# Patient Record
Sex: FEMALE | Race: WHITE | NOT HISPANIC OR LATINO | Employment: OTHER | URBAN - METROPOLITAN AREA
[De-identification: names, ages, dates, MRNs, and addresses within clinical notes are randomized per-mention and may not be internally consistent; named-entity substitution may affect disease eponyms.]

---

## 2024-06-12 ENCOUNTER — HOSPITAL ENCOUNTER (OUTPATIENT)
Dept: RADIOLOGY | Facility: HOSPITAL | Age: 69
Discharge: HOME | End: 2024-06-12
Payer: MEDICARE

## 2024-06-12 VITALS — WEIGHT: 142 LBS | BODY MASS INDEX: 22.82 KG/M2 | HEIGHT: 66 IN

## 2024-06-12 DIAGNOSIS — Z12.31 ENCOUNTER FOR SCREENING MAMMOGRAM FOR MALIGNANT NEOPLASM OF BREAST: ICD-10-CM

## 2024-06-12 DIAGNOSIS — Z78.0 ASYMPTOMATIC MENOPAUSAL STATE: ICD-10-CM

## 2024-06-12 PROCEDURE — 77063 BREAST TOMOSYNTHESIS BI: CPT | Performed by: RADIOLOGY

## 2024-06-12 PROCEDURE — 77080 DXA BONE DENSITY AXIAL: CPT

## 2024-06-12 PROCEDURE — 77067 SCR MAMMO BI INCL CAD: CPT | Performed by: RADIOLOGY

## 2024-06-12 PROCEDURE — 77063 BREAST TOMOSYNTHESIS BI: CPT

## 2024-07-11 PROCEDURE — 88305 TISSUE EXAM BY PATHOLOGIST: CPT | Performed by: PATHOLOGY

## 2024-07-11 PROCEDURE — 88305 TISSUE EXAM BY PATHOLOGIST: CPT

## 2024-07-11 PROCEDURE — 0753T DGTZ GLS MCRSCP SLD LEVEL IV: CPT

## 2024-07-17 ENCOUNTER — LAB REQUISITION (OUTPATIENT)
Dept: LAB | Facility: HOSPITAL | Age: 69
End: 2024-07-17
Payer: MEDICARE

## 2024-07-17 DIAGNOSIS — Z85.828 PERSONAL HISTORY OF OTHER MALIGNANT NEOPLASM OF SKIN: ICD-10-CM

## 2024-07-17 DIAGNOSIS — L89.90 PRESSURE ULCER OF UNSPECIFIED SITE, UNSPECIFIED STAGE: ICD-10-CM

## 2024-07-24 LAB
LABORATORY COMMENT REPORT: NORMAL
PATH REPORT.FINAL DX SPEC: NORMAL
PATH REPORT.GROSS SPEC: NORMAL
PATH REPORT.RELEVANT HX SPEC: NORMAL
PATH REPORT.TOTAL CANCER: NORMAL

## 2024-08-09 ENCOUNTER — PRE-ADMISSION TESTING (OUTPATIENT)
Dept: PREADMISSION TESTING | Facility: HOSPITAL | Age: 69
End: 2024-08-09
Payer: MEDICARE

## 2024-08-09 VITALS
RESPIRATION RATE: 18 BRPM | SYSTOLIC BLOOD PRESSURE: 150 MMHG | DIASTOLIC BLOOD PRESSURE: 94 MMHG | BODY MASS INDEX: 22.85 KG/M2 | HEIGHT: 66 IN | WEIGHT: 142.2 LBS | OXYGEN SATURATION: 99 % | TEMPERATURE: 97.5 F | HEART RATE: 72 BPM

## 2024-08-09 DIAGNOSIS — Z01.818 PREOPERATIVE TESTING: Primary | ICD-10-CM

## 2024-08-09 LAB
ANION GAP SERPL CALC-SCNC: 9 MMOL/L
BUN SERPL-MCNC: 14 MG/DL (ref 8–25)
CALCIUM SERPL-MCNC: 9.6 MG/DL (ref 8.5–10.4)
CHLORIDE SERPL-SCNC: 99 MMOL/L (ref 97–107)
CO2 SERPL-SCNC: 29 MMOL/L (ref 24–31)
CREAT SERPL-MCNC: 0.8 MG/DL (ref 0.4–1.6)
EGFRCR SERPLBLD CKD-EPI 2021: 80 ML/MIN/1.73M*2
ERYTHROCYTE [DISTWIDTH] IN BLOOD BY AUTOMATED COUNT: 12.6 % (ref 11.5–14.5)
GLUCOSE SERPL-MCNC: 91 MG/DL (ref 65–99)
HCT VFR BLD AUTO: 43.4 % (ref 36–46)
HGB BLD-MCNC: 14.2 G/DL (ref 12–16)
MCH RBC QN AUTO: 30 PG (ref 26–34)
MCHC RBC AUTO-ENTMCNC: 32.7 G/DL (ref 32–36)
MCV RBC AUTO: 92 FL (ref 80–100)
NRBC BLD-RTO: 0 /100 WBCS (ref 0–0)
PLATELET # BLD AUTO: 376 X10*3/UL (ref 150–450)
POTASSIUM SERPL-SCNC: 3.8 MMOL/L (ref 3.4–5.1)
RBC # BLD AUTO: 4.73 X10*6/UL (ref 4–5.2)
SODIUM SERPL-SCNC: 137 MMOL/L (ref 133–145)
WBC # BLD AUTO: 9.1 X10*3/UL (ref 4.4–11.3)

## 2024-08-09 PROCEDURE — 80048 BASIC METABOLIC PNL TOTAL CA: CPT | Performed by: NURSE PRACTITIONER

## 2024-08-09 PROCEDURE — 85027 COMPLETE CBC AUTOMATED: CPT | Performed by: NURSE PRACTITIONER

## 2024-08-09 PROCEDURE — 36415 COLL VENOUS BLD VENIPUNCTURE: CPT

## 2024-08-09 PROCEDURE — 99204 OFFICE O/P NEW MOD 45 MIN: CPT | Performed by: NURSE PRACTITIONER

## 2024-08-09 RX ORDER — CHOLECALCIFEROL (VITAMIN D3) 25 MCG
1000 TABLET ORAL AS NEEDED
COMMUNITY

## 2024-08-09 RX ORDER — HYDROCHLOROTHIAZIDE 12.5 MG/1
12.5 CAPSULE ORAL EVERY MORNING
COMMUNITY

## 2024-08-09 ASSESSMENT — DUKE ACTIVITY SCORE INDEX (DASI)
CAN YOU DO YARD WORK LIKE RAKING LEAVES, WEEDING OR PUSHING A MOWER: YES
CAN YOU RUN A SHORT DISTANCE: YES
CAN YOU DO LIGHT WORK AROUND THE HOUSE LIKE DUSTING OR WASHING DISHES: YES
CAN YOU PARTICIPATE IN MODERATE RECREATIONAL ACTIVITIES LIKE GOLF, BOWLING, DANCING, DOUBLES TENNIS OR THROWING A BASEBALL OR FOOTBALL: YES
CAN YOU DO MODERATE WORK AROUND THE HOUSE LIKE VACUUMING, SWEEPING FLOORS OR CARRYING GROCERIES: YES
CAN YOU TAKE CARE OF YOURSELF (EAT, DRESS, BATHE, OR USE TOILET): YES
DASI METS SCORE: 9.9
CAN YOU HAVE SEXUAL RELATIONS: YES
CAN YOU PARTICIPATE IN STRENOUS SPORTS LIKE SWIMMING, SINGLES TENNIS, FOOTBALL, BASKETBALL, OR SKIING: YES
CAN YOU CLIMB A FLIGHT OF STAIRS OR WALK UP A HILL: YES
CAN YOU DO HEAVY WORK AROUND THE HOUSE LIKE SCRUBBING FLOORS OR LIFTING AND MOVING HEAVY FURNITURE: YES
TOTAL_SCORE: 58.2
CAN YOU WALK INDOORS, SUCH AS AROUND YOUR HOUSE: YES
CAN YOU WALK A BLOCK OR TWO ON LEVEL GROUND: YES

## 2024-08-09 ASSESSMENT — ENCOUNTER SYMPTOMS
RESPIRATORY NEGATIVE: 1
GASTROINTESTINAL NEGATIVE: 1
CONSTITUTIONAL NEGATIVE: 1
MUSCULOSKELETAL NEGATIVE: 1
NEUROLOGICAL NEGATIVE: 1
EYES NEGATIVE: 1
CARDIOVASCULAR NEGATIVE: 1
ROS SKIN COMMENTS: H/O SKIN CANCER
PSYCHIATRIC NEGATIVE: 1

## 2024-08-09 NOTE — CPM/PAT H&P
"CPM/PAT Evaluation       Name: Jerilyn CURRY Chavira (Jerilyn CURRY Chavira)  /Age: 1955/68 y.o.     In-Person       Chief Complaint: Basal Cell carcinoma left nose     HPI  A 68-year-old female with basal cell carcinoma left nose.  History of skin cancer status post multiple excisions.  She underwent skin cancer lesion removal on her nose in her 20s and in 2016.  Patient reports biopsy of the nose was done a month ago because \"My skin looked shiny on my nose\". Biopsy of the nose revealed basal cell carcinoma. Denies fever, chills, pain, or bleeding.  She is scheduled for basal cell carcinoma excision bilobed flap with frozen section from left nose, left wound closure, left face reconstruction.    Past Medical History:   Diagnosis Date    HTN (hypertension)     Skin cancer     Smoker        Past Surgical History:   Procedure Laterality Date    BREAST CYST EXCISION Left     15 years ago    BUNIONECTOMY       SECTION, LOW TRANSVERSE  2001    CYST REMOVAL Right     hand    SKIN CANCER EXCISION      forehead, leg nose         Allergies   Allergen Reactions    Bleach (Sodium Hypochlorite) Shortness of breath     DIFFICULTY BREATHING  EYES BURN SEVERELY    Egg Yolk Anaphylaxis    Bacitracin Dermatitis     \"EATS THE SKIN AWAY\"    Neosporin (Mrn-Ofm-Qcebl) [Neomycin-Bacitracnzn-Polymyxnb] Dermatitis     \"EATS THE SKIN AWAY\"       Current Outpatient Medications   Medication Sig Dispense Refill    cholecalciferol (Vitamin D-3) 25 MCG (1000 UT) tablet Take 1 tablet (1,000 Units) by mouth if needed (MOSTLY IN THE WINTER).      hydroCHLOROthiazide (Microzide) 12.5 mg capsule Take 1 capsule (12.5 mg) by mouth once daily in the morning.       No current facility-administered medications for this visit.     Review of Systems   Constitutional: Negative.    HENT: Negative.     Eyes: Negative.         Glasses   Respiratory: Negative.     Cardiovascular: Negative.    Gastrointestinal: Negative.    Genitourinary: Negative.  " "  Musculoskeletal: Negative.    Skin: Negative.         H/o skin cancer   Neurological: Negative.    Psychiatric/Behavioral: Negative.          Physical Exam  Vitals (BP repeat 143/83) reviewed.   HENT:      Head: Normocephalic and atraumatic.      Mouth/Throat:      Mouth: Mucous membranes are moist.   Eyes:      Pupils: Pupils are equal, round, and reactive to light.      Comments: glasses   Cardiovascular:      Rate and Rhythm: Normal rate and regular rhythm.   Pulmonary:      Effort: Pulmonary effort is normal.      Breath sounds: Normal breath sounds.   Abdominal:      Palpations: Abdomen is soft.   Musculoskeletal:         General: Normal range of motion.      Cervical back: Normal range of motion.   Skin:     General: Skin is warm and dry.      Comments: Left side of nose with small scar present   Neurological:      Mental Status: She is alert and oriented to person, place, and time.   Psychiatric:         Mood and Affect: Mood normal.          PAT AIRWAY:   Airway:     Mallampati::  II    Neck ROM::  Full  normal        BP (!) 150/94   Pulse 72   Temp 36.4 °C (97.5 °F) (Temporal)   Resp 18   Ht 1.676 m (5' 6\")   Wt 64.5 kg (142 lb 3.2 oz)   SpO2 99%   BMI 22.95 kg/m²         Stop Bang Score 2   CHADS:  2.8%  DASI risk score: 58.2  METS: 9.9  RCRI:1.9%  ASA: II  ARISCAT:1.6%  PAT orders CBC, BMP      Assessment and Plan:     Basal Cell carcinoma left nose Plan: Basal cell carcinoma excision bilobed flap with frozen section from left nose, left wound closure, left face reconstruction  Hypertension managed with hydrochlorothiazide  Smoker   BMI 22.95        "

## 2024-08-09 NOTE — H&P (VIEW-ONLY)
"CPM/PAT Evaluation       Name: Jerilyn CURRY Chavira (Jerilyn CURRY Chavira)  /Age: 1955/68 y.o.     In-Person       Chief Complaint: Basal Cell carcinoma left nose     HPI  A 68-year-old female with basal cell carcinoma left nose.  History of skin cancer status post multiple excisions.  She underwent skin cancer lesion removal on her nose in her 20s and in 2016.  Patient reports biopsy of the nose was done a month ago because \"My skin looked shiny on my nose\". Biopsy of the nose revealed basal cell carcinoma. Denies fever, chills, pain, or bleeding.  She is scheduled for basal cell carcinoma excision bilobed flap with frozen section from left nose, left wound closure, left face reconstruction.    Past Medical History:   Diagnosis Date    HTN (hypertension)     Skin cancer     Smoker        Past Surgical History:   Procedure Laterality Date    BREAST CYST EXCISION Left     15 years ago    BUNIONECTOMY       SECTION, LOW TRANSVERSE  2001    CYST REMOVAL Right     hand    SKIN CANCER EXCISION      forehead, leg nose         Allergies   Allergen Reactions    Bleach (Sodium Hypochlorite) Shortness of breath     DIFFICULTY BREATHING  EYES BURN SEVERELY    Egg Yolk Anaphylaxis    Bacitracin Dermatitis     \"EATS THE SKIN AWAY\"    Neosporin (Pbb-Dju-Aipaa) [Neomycin-Bacitracnzn-Polymyxnb] Dermatitis     \"EATS THE SKIN AWAY\"       Current Outpatient Medications   Medication Sig Dispense Refill    cholecalciferol (Vitamin D-3) 25 MCG (1000 UT) tablet Take 1 tablet (1,000 Units) by mouth if needed (MOSTLY IN THE WINTER).      hydroCHLOROthiazide (Microzide) 12.5 mg capsule Take 1 capsule (12.5 mg) by mouth once daily in the morning.       No current facility-administered medications for this visit.     Review of Systems   Constitutional: Negative.    HENT: Negative.     Eyes: Negative.         Glasses   Respiratory: Negative.     Cardiovascular: Negative.    Gastrointestinal: Negative.    Genitourinary: Negative.  " "  Musculoskeletal: Negative.    Skin: Negative.         H/o skin cancer   Neurological: Negative.    Psychiatric/Behavioral: Negative.          Physical Exam  Vitals (BP repeat 143/83) reviewed.   HENT:      Head: Normocephalic and atraumatic.      Mouth/Throat:      Mouth: Mucous membranes are moist.   Eyes:      Pupils: Pupils are equal, round, and reactive to light.      Comments: glasses   Cardiovascular:      Rate and Rhythm: Normal rate and regular rhythm.   Pulmonary:      Effort: Pulmonary effort is normal.      Breath sounds: Normal breath sounds.   Abdominal:      Palpations: Abdomen is soft.   Musculoskeletal:         General: Normal range of motion.      Cervical back: Normal range of motion.   Skin:     General: Skin is warm and dry.      Comments: Left side of nose with small scar present   Neurological:      Mental Status: She is alert and oriented to person, place, and time.   Psychiatric:         Mood and Affect: Mood normal.          PAT AIRWAY:   Airway:     Mallampati::  II    Neck ROM::  Full  normal        BP (!) 150/94   Pulse 72   Temp 36.4 °C (97.5 °F) (Temporal)   Resp 18   Ht 1.676 m (5' 6\")   Wt 64.5 kg (142 lb 3.2 oz)   SpO2 99%   BMI 22.95 kg/m²         Stop Bang Score 2   CHADS:  2.8%  DASI risk score: 58.2  METS: 9.9  RCRI:1.9%  ASA: II  ARISCAT:1.6%  PAT orders CBC, BMP      Assessment and Plan:     Basal Cell carcinoma left nose Plan: Basal cell carcinoma excision bilobed flap with frozen section from left nose, left wound closure, left face reconstruction  Hypertension managed with hydrochlorothiazide  Smoker   BMI 22.95        "

## 2024-08-09 NOTE — PREPROCEDURE INSTRUCTIONS
Medication List            Accurate as of August 9, 2024  8:32 AM. Always use your most recent med list.                cholecalciferol 25 MCG (1000 UT) tablet  Commonly known as: Vitamin D-3  Medication Adjustments for Surgery: Stop 7 days before surgery     hydroCHLOROthiazide 12.5 mg capsule  Commonly known as: Microzide  Medication Adjustments for Surgery: Other (Comment)  Notes to patient: TAKE MORNING OF 8/13  HOLD EVENING DOSE 8/13  HOLD MORNING DOSE 8/14                              Preoperative Fasting Guidelines    Why must I stop eating and drinking near surgery time?  With sedation, food or liquid in your stomach can enter your lungs causing serious complications  Increases nausea and vomiting    When do I need to stop eating and drinking before my surgery?  Do not eat any food or drink any liquids after midnight the night before your surgery/procedure.  You may have sips of water to take medications.        PAT DISCHARGE INSTRUCTIONS    Please call the Same Day Surgery (SDS) Department of the hospital where your procedure will be performed after 2:00 PM the day before your surgery. If you are scheduled on a Monday, or a Tuesday following a Monday holiday, you will need to call on the last business day prior to your surgery.    [facility]  [address] [place] [address], [id number] [phone]  [facility] Floor    [facility]  [address] [place] [address] [phone]  [facility]  [address] [place] [address].  [place] [id number] [phone]    Please let your surgeon know if:      You develop any open sores, shingles, burning or painful urination as these may increase your risk of an infection.   You no longer wish to have the surgery.   Any other personal circumstances change that may lead to the need to cancel or defer this surgery-such as being sick or getting admitted to any  hospital within one week of your planned procedure.    Your contact details change, such as a change of address or phone number.    Starting now:     Please DO NOT drink alcohol or smoke for 24 hours before surgery. It is well known that quitting smoking can make a huge difference to your health and recovery from surgery. The longer you abstain from smoking, the better your chances of a healthy recovery. If you need help with quitting, call 8-800-QUIT-NOW to be connected to a trained counselor who will discuss the best methods to help you quit.     Before your surgery:    Please stop all supplements 7 days prior to surgery. Or as directed by your surgeon.   Please stop taking NSAID pain medicine such as Advil and Motrin 7 days before surgery.    If you develop any fever, cough, cold, rashes, cuts, scratches, scrapes, urinary symptoms or infection anywhere on your body (including teeth and gums) prior to surgery, please call your surgeon’s office as soon as possible. This may require treatment to reduce the chance of cancellation on the day of surgery.    The day before your surgery:   DIET- Please follow the diet instructions at the top of your packet.   Get a good night’s rest.  Use the special soap for bathing if you have been instructed to use one.    Scheduled surgery times may change and you will be notified if this occurs - please check your personal voicemail for any updates.     On the morning of surgery:   Wear comfortable, loose fitting clothes which open in the front. Please do not wear moisturizers, creams, lotions, makeup or perfume.    Please bring with you to surgery:   Photo ID and insurance card   Current list of medicines and allergies   Pacemaker/ Defibrillator/Heart stent cards   CPAP machine and mask    Slings/ splints/ crutches   A copy of your complete advanced directive/DHPOA.    Please do NOT bring with you to surgery:   All jewelry and valuables should be left at home.   Prosthetic devices  such as contact lenses, hearing aids, dentures, eyelash extensions, hairpins and body piercings must be removed prior to going in to the surgical suite.    After outpatient surgery:   A responsible adult MUST accompany you at the time of discharge and stay with you for 24 hours after your surgery. You may NOT drive yourself home after surgery.    Do not drive, operate machinery, make critical decisions or do activities that require co-ordination or balance until after a night’s sleep.   Do not drink alcoholic beverages for 24 hours.   Instructions for resuming your medications will be provided by your surgeon.    CALL YOUR DOCTOR AFTER SURGERY IF YOU HAVE:     Chills and/or a fever of 101° F or higher.    Redness, swelling, pus or drainage from your surgical wound or a bad smell from the wound.    Lightheadedness, fainting or confusion.    Persistent vomiting (throwing up) and are not able to eat or drink for 12 hours.    Three or more loose, watery bowel movements in 24 hours (diarrhea).   Difficulty or pain while urinating( after non-urological surgery)    Pain and swelling in your legs, especially if it is only on one side.    Difficulty breathing or are breathing faster than normal.    Any new concerning symptoms.

## 2024-08-14 ENCOUNTER — ANESTHESIA EVENT (OUTPATIENT)
Dept: OPERATING ROOM | Facility: HOSPITAL | Age: 69
End: 2024-08-14
Payer: MEDICARE

## 2024-08-14 ENCOUNTER — ANESTHESIA (OUTPATIENT)
Dept: OPERATING ROOM | Facility: HOSPITAL | Age: 69
End: 2024-08-14
Payer: MEDICARE

## 2024-08-14 ENCOUNTER — HOSPITAL ENCOUNTER (OUTPATIENT)
Facility: HOSPITAL | Age: 69
Setting detail: OUTPATIENT SURGERY
Discharge: HOME | End: 2024-08-14
Attending: SPECIALIST | Admitting: SPECIALIST
Payer: MEDICARE

## 2024-08-14 ENCOUNTER — PHARMACY VISIT (OUTPATIENT)
Dept: PHARMACY | Facility: CLINIC | Age: 69
End: 2024-08-14
Payer: MEDICARE

## 2024-08-14 VITALS
RESPIRATION RATE: 20 BRPM | HEIGHT: 66 IN | OXYGEN SATURATION: 94 % | WEIGHT: 142 LBS | DIASTOLIC BLOOD PRESSURE: 71 MMHG | HEART RATE: 75 BPM | BODY MASS INDEX: 22.82 KG/M2 | TEMPERATURE: 98.4 F | SYSTOLIC BLOOD PRESSURE: 124 MMHG

## 2024-08-14 DIAGNOSIS — C44.311 BASAL CELL CARCINOMA OF NOSE: ICD-10-CM

## 2024-08-14 PROCEDURE — 3700000001 HC GENERAL ANESTHESIA TIME - INITIAL BASE CHARGE: Performed by: SPECIALIST

## 2024-08-14 PROCEDURE — 88332 PATH CONSLTJ SURG EA ADD BLK: CPT | Mod: TC | Performed by: SPECIALIST

## 2024-08-14 PROCEDURE — 7100000001 HC RECOVERY ROOM TIME - INITIAL BASE CHARGE: Performed by: SPECIALIST

## 2024-08-14 PROCEDURE — 7100000010 HC PHASE TWO TIME - EACH INCREMENTAL 1 MINUTE: Performed by: SPECIALIST

## 2024-08-14 PROCEDURE — 2500000004 HC RX 250 GENERAL PHARMACY W/ HCPCS (ALT 636 FOR OP/ED): Performed by: ANESTHESIOLOGIST ASSISTANT

## 2024-08-14 PROCEDURE — 88305 TISSUE EXAM BY PATHOLOGIST: CPT | Mod: TC | Performed by: SPECIALIST

## 2024-08-14 PROCEDURE — 2500000002 HC RX 250 W HCPCS SELF ADMINISTERED DRUGS (ALT 637 FOR MEDICARE OP, ALT 636 FOR OP/ED): Performed by: ANESTHESIOLOGY

## 2024-08-14 PROCEDURE — 7100000002 HC RECOVERY ROOM TIME - EACH INCREMENTAL 1 MINUTE: Performed by: SPECIALIST

## 2024-08-14 PROCEDURE — A4649 SURGICAL SUPPLIES: HCPCS | Performed by: SPECIALIST

## 2024-08-14 PROCEDURE — 3600000004 HC OR TIME - INITIAL BASE CHARGE - PROCEDURE LEVEL FOUR: Performed by: SPECIALIST

## 2024-08-14 PROCEDURE — 2500000005 HC RX 250 GENERAL PHARMACY W/O HCPCS: Performed by: ANESTHESIOLOGIST ASSISTANT

## 2024-08-14 PROCEDURE — 2720000007 HC OR 272 NO HCPCS: Performed by: SPECIALIST

## 2024-08-14 PROCEDURE — 7100000009 HC PHASE TWO TIME - INITIAL BASE CHARGE: Performed by: SPECIALIST

## 2024-08-14 PROCEDURE — RXMED WILLOW AMBULATORY MEDICATION CHARGE

## 2024-08-14 PROCEDURE — 3600000009 HC OR TIME - EACH INCREMENTAL 1 MINUTE - PROCEDURE LEVEL FOUR: Performed by: SPECIALIST

## 2024-08-14 PROCEDURE — 2500000005 HC RX 250 GENERAL PHARMACY W/O HCPCS: Performed by: SPECIALIST

## 2024-08-14 PROCEDURE — 2500000004 HC RX 250 GENERAL PHARMACY W/ HCPCS (ALT 636 FOR OP/ED): Mod: JZ | Performed by: SPECIALIST

## 2024-08-14 PROCEDURE — 2500000004 HC RX 250 GENERAL PHARMACY W/ HCPCS (ALT 636 FOR OP/ED): Performed by: SPECIALIST

## 2024-08-14 PROCEDURE — 3700000002 HC GENERAL ANESTHESIA TIME - EACH INCREMENTAL 1 MINUTE: Performed by: SPECIALIST

## 2024-08-14 PROCEDURE — 2500000001 HC RX 250 WO HCPCS SELF ADMINISTERED DRUGS (ALT 637 FOR MEDICARE OP): Performed by: SPECIALIST

## 2024-08-14 RX ORDER — OXYCODONE HCL 10 MG/1
10 TABLET, FILM COATED, EXTENDED RELEASE ORAL ONCE AS NEEDED
Status: CANCELLED | OUTPATIENT
Start: 2024-08-14

## 2024-08-14 RX ORDER — FENTANYL CITRATE 50 UG/ML
INJECTION, SOLUTION INTRAMUSCULAR; INTRAVENOUS AS NEEDED
Status: DISCONTINUED | OUTPATIENT
Start: 2024-08-14 | End: 2024-08-14

## 2024-08-14 RX ORDER — ONDANSETRON 4 MG/1
4 TABLET, FILM COATED ORAL EVERY 6 HOURS PRN
Qty: 10 TABLET | Refills: 0 | Status: SHIPPED | OUTPATIENT
Start: 2024-08-14

## 2024-08-14 RX ORDER — ALBUTEROL SULFATE 0.83 MG/ML
2.5 SOLUTION RESPIRATORY (INHALATION) ONCE
Status: COMPLETED | OUTPATIENT
Start: 2024-08-14 | End: 2024-08-14

## 2024-08-14 RX ORDER — ACETAMINOPHEN 500 MG
500 TABLET ORAL ONCE
Status: COMPLETED | OUTPATIENT
Start: 2024-08-14 | End: 2024-08-14

## 2024-08-14 RX ORDER — MUPIROCIN 20 MG/G
OINTMENT TOPICAL 3 TIMES DAILY
Qty: 22 G | Refills: 1 | Status: SHIPPED | OUTPATIENT
Start: 2024-08-14

## 2024-08-14 RX ORDER — KETOROLAC TROMETHAMINE 30 MG/ML
15 INJECTION, SOLUTION INTRAMUSCULAR; INTRAVENOUS ONCE AS NEEDED
Status: DISCONTINUED | OUTPATIENT
Start: 2024-08-14 | End: 2024-08-14 | Stop reason: HOSPADM

## 2024-08-14 RX ORDER — ONDANSETRON HYDROCHLORIDE 2 MG/ML
INJECTION, SOLUTION INTRAVENOUS AS NEEDED
Status: DISCONTINUED | OUTPATIENT
Start: 2024-08-14 | End: 2024-08-14

## 2024-08-14 RX ORDER — NORETHINDRONE AND ETHINYL ESTRADIOL 0.5-0.035
KIT ORAL AS NEEDED
Status: DISCONTINUED | OUTPATIENT
Start: 2024-08-14 | End: 2024-08-14

## 2024-08-14 RX ORDER — CEFAZOLIN SODIUM 2 G/100ML
2 INJECTION, SOLUTION INTRAVENOUS ONCE
Status: COMPLETED | OUTPATIENT
Start: 2024-08-14 | End: 2024-08-14

## 2024-08-14 RX ORDER — LIDOCAINE HYDROCHLORIDE AND EPINEPHRINE 10; 10 MG/ML; UG/ML
INJECTION, SOLUTION INFILTRATION; PERINEURAL AS NEEDED
Status: DISCONTINUED | OUTPATIENT
Start: 2024-08-14 | End: 2024-08-14 | Stop reason: HOSPADM

## 2024-08-14 RX ORDER — LABETALOL HYDROCHLORIDE 5 MG/ML
10 INJECTION, SOLUTION INTRAVENOUS ONCE AS NEEDED
Status: DISCONTINUED | OUTPATIENT
Start: 2024-08-14 | End: 2024-08-14 | Stop reason: HOSPADM

## 2024-08-14 RX ORDER — ACETAMINOPHEN 500 MG
500 TABLET ORAL EVERY 6 HOURS PRN
COMMUNITY

## 2024-08-14 RX ORDER — METOCLOPRAMIDE 10 MG/1
10 TABLET ORAL ONCE
Status: DISCONTINUED | OUTPATIENT
Start: 2024-08-14 | End: 2024-08-14 | Stop reason: HOSPADM

## 2024-08-14 RX ORDER — FAMOTIDINE 20 MG/1
20 TABLET, FILM COATED ORAL ONCE
Status: DISCONTINUED | OUTPATIENT
Start: 2024-08-14 | End: 2024-08-14 | Stop reason: HOSPADM

## 2024-08-14 RX ORDER — OXYCODONE AND ACETAMINOPHEN 5; 325 MG/1; MG/1
1 TABLET ORAL EVERY 6 HOURS PRN
Qty: 10 TABLET | Refills: 0 | Status: SHIPPED | OUTPATIENT
Start: 2024-08-14

## 2024-08-14 RX ORDER — GLYCOPYRROLATE 0.2 MG/ML
INJECTION INTRAMUSCULAR; INTRAVENOUS AS NEEDED
Status: DISCONTINUED | OUTPATIENT
Start: 2024-08-14 | End: 2024-08-14

## 2024-08-14 RX ORDER — BUPIVACAINE HYDROCHLORIDE 5 MG/ML
INJECTION, SOLUTION PERINEURAL AS NEEDED
Status: DISCONTINUED | OUTPATIENT
Start: 2024-08-14 | End: 2024-08-14 | Stop reason: HOSPADM

## 2024-08-14 RX ORDER — MIDAZOLAM HYDROCHLORIDE 1 MG/ML
INJECTION, SOLUTION INTRAMUSCULAR; INTRAVENOUS AS NEEDED
Status: DISCONTINUED | OUTPATIENT
Start: 2024-08-14 | End: 2024-08-14

## 2024-08-14 RX ORDER — CEPHALEXIN 500 MG/1
500 CAPSULE ORAL 4 TIMES DAILY
Qty: 40 CAPSULE | Refills: 0 | Status: SHIPPED | OUTPATIENT
Start: 2024-08-14 | End: 2024-08-24

## 2024-08-14 RX ORDER — NORETHINDRONE AND ETHINYL ESTRADIOL 0.5-0.035
50 KIT ORAL ONCE
Status: DISCONTINUED | OUTPATIENT
Start: 2024-08-14 | End: 2024-08-14 | Stop reason: HOSPADM

## 2024-08-14 RX ORDER — PROPOFOL 10 MG/ML
INJECTION, EMULSION INTRAVENOUS AS NEEDED
Status: DISCONTINUED | OUTPATIENT
Start: 2024-08-14 | End: 2024-08-14

## 2024-08-14 RX ORDER — ONDANSETRON HYDROCHLORIDE 2 MG/ML
4 INJECTION, SOLUTION INTRAVENOUS ONCE AS NEEDED
Status: DISCONTINUED | OUTPATIENT
Start: 2024-08-14 | End: 2024-08-14 | Stop reason: HOSPADM

## 2024-08-14 RX ORDER — HYDRALAZINE HYDROCHLORIDE 20 MG/ML
10 INJECTION INTRAMUSCULAR; INTRAVENOUS EVERY 30 MIN PRN
Status: DISCONTINUED | OUTPATIENT
Start: 2024-08-14 | End: 2024-08-14 | Stop reason: HOSPADM

## 2024-08-14 RX ORDER — GABAPENTIN 300 MG/1
600 CAPSULE ORAL ONCE
Status: COMPLETED | OUTPATIENT
Start: 2024-08-14 | End: 2024-08-14

## 2024-08-14 RX ORDER — ONDANSETRON 4 MG/1
4 TABLET, ORALLY DISINTEGRATING ORAL ONCE
Status: COMPLETED | OUTPATIENT
Start: 2024-08-14 | End: 2024-08-14

## 2024-08-14 RX ORDER — DEXTROSE, SODIUM CHLORIDE, SODIUM LACTATE, POTASSIUM CHLORIDE, AND CALCIUM CHLORIDE 5; .6; .31; .03; .02 G/100ML; G/100ML; G/100ML; G/100ML; G/100ML
100 INJECTION, SOLUTION INTRAVENOUS CONTINUOUS
Status: DISCONTINUED | OUTPATIENT
Start: 2024-08-14 | End: 2024-08-14 | Stop reason: HOSPADM

## 2024-08-14 RX ORDER — ALBUTEROL SULFATE 0.83 MG/ML
2.5 SOLUTION RESPIRATORY (INHALATION) ONCE AS NEEDED
Status: DISCONTINUED | OUTPATIENT
Start: 2024-08-14 | End: 2024-08-14 | Stop reason: HOSPADM

## 2024-08-14 RX ORDER — ACETAMINOPHEN 325 MG/1
650 TABLET ORAL ONCE
Status: DISCONTINUED | OUTPATIENT
Start: 2024-08-14 | End: 2024-08-14

## 2024-08-14 RX ORDER — TRANEXAMIC ACID 100 MG/ML
INJECTION, SOLUTION INTRAVENOUS AS NEEDED
Status: DISCONTINUED | OUTPATIENT
Start: 2024-08-14 | End: 2024-08-14

## 2024-08-14 RX ORDER — DIPHENHYDRAMINE HYDROCHLORIDE 50 MG/ML
12.5 INJECTION INTRAMUSCULAR; INTRAVENOUS ONCE AS NEEDED
Status: DISCONTINUED | OUTPATIENT
Start: 2024-08-14 | End: 2024-08-14 | Stop reason: HOSPADM

## 2024-08-14 RX ORDER — CELECOXIB 200 MG/1
400 CAPSULE ORAL ONCE
Status: COMPLETED | OUTPATIENT
Start: 2024-08-14 | End: 2024-08-14

## 2024-08-14 SDOH — HEALTH STABILITY: MENTAL HEALTH: CURRENT SMOKER: 0

## 2024-08-14 ASSESSMENT — PAIN SCALES - GENERAL
PAINLEVEL_OUTOF10: 0 - NO PAIN
PAIN_LEVEL: 2
PAINLEVEL_OUTOF10: 0 - NO PAIN

## 2024-08-14 ASSESSMENT — COLUMBIA-SUICIDE SEVERITY RATING SCALE - C-SSRS
2. HAVE YOU ACTUALLY HAD ANY THOUGHTS OF KILLING YOURSELF?: NO
1. IN THE PAST MONTH, HAVE YOU WISHED YOU WERE DEAD OR WISHED YOU COULD GO TO SLEEP AND NOT WAKE UP?: NO
6. HAVE YOU EVER DONE ANYTHING, STARTED TO DO ANYTHING, OR PREPARED TO DO ANYTHING TO END YOUR LIFE?: NO

## 2024-08-14 ASSESSMENT — PAIN - FUNCTIONAL ASSESSMENT
PAIN_FUNCTIONAL_ASSESSMENT: 0-10
PAIN_FUNCTIONAL_ASSESSMENT: FLACC (FACE, LEGS, ACTIVITY, CRY, CONSOLABILITY)
PAIN_FUNCTIONAL_ASSESSMENT: 0-10
PAIN_FUNCTIONAL_ASSESSMENT: 0-10

## 2024-08-14 NOTE — PERIOPERATIVE NURSING NOTE
1029- Message left with Dr Cheek's office, bactroban cream not available until 8/15, does he want the ointment instead? [facility] to call back with POC.

## 2024-08-14 NOTE — ANESTHESIA POSTPROCEDURE EVALUATION
"Patient: Jerilyn CURRY Chavira    Procedure Summary       Date: 08/14/24 Room / Location: [facility] 05 / [facility]    Anesthesia Start: 1038 Anesthesia Stop: 1321    Procedures:       Excision of Basal Cell Carcinoma Nose with Frozen Section, Bilobed Flap (Left: Nose)      Wound Closure (Left: Nose)      Reconstruction Face (Left: Nose) Diagnosis:       Basal cell carcinoma of nose      (Basal Cell carcinoma left nose)    Surgeons: Matti Cheek MD Responsible Provider: Ab Gonzales DO    Anesthesia Type: general ASA Status: 3            Anesthesia Type: general  /83   Pulse 59   Temp 36.3 °C (97.3 °F) (Temporal)   Resp 16   Ht 1.676 m (5' 6\")   Wt 64.4 kg (142 lb)   SpO2 100%   BMI 22.92 kg/m²     Anesthesia Post Evaluation    Patient location during evaluation: bedside  Patient participation: complete - patient participated  Level of consciousness: awake  Pain score: 2  Pain management: adequate  Airway patency: patent  Two or more strategies used to mitigate risk of obstructive sleep apnea  Cardiovascular status: acceptable  Respiratory status: acceptable  Hydration status: acceptable  Postoperative Nausea and Vomiting: none        No notable events documented.    "

## 2024-08-14 NOTE — POST-PROCEDURE NOTE
Pt received from pacu to [facility] via cart in no acute distress.  Cookies and gingerale taken well.    1530 up to bathroom . Voiding without any problems.  Daughter in room with pt.    1530  instructed the pt not to wash face and to apply ointment as ordered.

## 2024-08-14 NOTE — ANESTHESIA PROCEDURE NOTES
Airway  Date/Time: 8/14/2024 11:07 AM  Urgency: elective    Airway not difficult    Staffing  Performed: CAA   Authorized by: Ab Gonzales DO    Performed by: NAYELI Kirby  Patient location during procedure: OR    Indications and Patient Condition  Indications for airway management: anesthesia  Spontaneous Ventilation: absent  Sedation level: deep  Preoxygenated: yes  Patient position: sniffing  Mask difficulty assessment: 0 - not attempted    Final Airway Details  Final airway type: supraglottic airway      Successful airway: Size 3      Additional Comments  Easy LMA by student

## 2024-08-14 NOTE — ANESTHESIA PREPROCEDURE EVALUATION
Patient: Jerilyn CURRY Chavira    Procedure Information       Date/Time: 08/14/24 1130    Procedures:       Excision of Basal Cell Carcinoma Nose with Frozen Section, Bilobed Flap (Left: Nose)      Wound Closure (Left: Nose)      Reconstruction Face (Left: Nose) - Coordinate for frozen section    Location: [facility] / [facility]    Surgeons: Matti Cheek MD            Relevant Problems   No relevant active problems       Clinical information reviewed:   Tobacco  Allergies  Meds   Med Hx  Surg Hx  OB Status  Fam Hx  Soc   Hx        NPO Detail:  NPO/Void Status  Carbohydrate Drink Given Prior to Surgery? : N  Date of Last Liquid: 08/14/24  Time of Last Liquid: 0800  Date of Last Solid: 08/13/24  Time of Last Solid: 1700  Last Intake Type: Clear fluids  Time of Last Void: 0700         Physical Exam    Airway  Mallampati: II  TM distance: >3 FB     Cardiovascular   Rhythm: regular  Rate: normal     Dental - normal exam     Pulmonary   Breath sounds clear to auscultation     Abdominal   Abdomen: soft             Anesthesia Plan    History of general anesthesia?: yes  History of complications of general anesthesia?: no    ASA 3     general     The patient is not a current smoker.    intravenous induction   Postoperative administration of opioids is intended.  Anesthetic plan and risks discussed with patient.    Plan discussed with CRNA, attending and CAA.

## 2024-08-14 NOTE — OP NOTE
Excision of Basal Cell Carcinoma Nose with Frozen Section, Bilobed Flap (L), Wound Closure (L), Reconstruction Face (L) Operative Note     Date: 2024  OR Location: [facility]    Name: Jerilyn Chavira, : 1955, Age: 68 y.o., MRN: [id number], Sex: female    Diagnosis  Pre-op Diagnosis      * Basal cell carcinoma of nose [C44.311] positive margin x 2 nose basal cell carcinoma Post-op Diagnosis     * Basal cell carcinoma of nose [C44.311]     Procedures  Excision of Basal Cell Carcinoma Nose with Frozen Section, Bilobed Flap    Wound Closure  50679 - DC REPAIR COMPLEX EYELID/NOSE/EAR/LIP 2.6-7.5 CM    Reconstruction Face  50808 - DC ADJT TIS TRNSFR/REARRGMT E/N/E/L DFCT 10 SQ CM/<    Excision left nose biopsy-proven basal cell carcinoma with frozen section x 2 and excision permanent section margin left nose 3.1 cm  Fasciocutaneous flap nose  Local tissue rearrangement nose 4 x 4 cm  Surgeons      * Matti Cheek - Primary    Resident/Fellow/Other Assistant:  Surgeons and Role:  * No surgeons found with a matching role *    Procedure Summary  Anesthesia: Anesthesia type not filed in the log.  ASA: III  Anesthesia Staff: Anesthesiologist: Ab Gonzales DO  CRNA: MARYAM Castañeda-CRNA  C-AA: NAYELI Kirby  Estimated Blood Loss: 10 mL  Intra-op Medications: Administrations occurring from 1130 to 1405 on 24:  * No intraprocedure medications in log *           Anesthesia Record               Intraprocedure I/O Totals          Intake    LR bolus 900.00 mL    Tranexamic Acid 0.00 mL    The total shown is the total volume documented since Anesthesia Start was filed.    Total Intake 900 mL       Output    Est. Blood Loss 10 mL    Total Output 10 mL       Net    Net Volume 890 mL          Specimen:   ID Type Source Tests Collected by Time   1 : LEFT NOSE BCC SHORT STITCH SUPERIOR, LONG LEFT LATERAL Tissue SKIN EXCISIONAL BIOPSY SURGICAL PATHOLOGY EXAM Matti Cheek MD 2024 1059   2 : RE-EXCISION  LEFT NOSE BCC, NEW MARGIN INK, LONG STITCH LATERAL, SHORT STITCH INFERIOR Tissue SKIN EXCISIONAL BIOPSY SURGICAL PATHOLOGY EXAM Matti Cheek MD 8/14/2024 1157   3 : RE-EXCISION LEFT NOSE BCC, NEW LEFT LATERAL MARGIN INK Tissue SKIN EXCISIONAL BIOPSY SURGICAL PATHOLOGY EXAM Matti Cheek MD 8/14/2024 1241        Staff:   Circulator: Adolph  Scrub Person: Chase Bautista Scrub: Obie      Findings: Positive margin left lateral and central inferior margins.  Deep margin positive left lateral.  Subsequent skin cancer frozen section head close margin left lateral requiring third excisional margin sent for permanent section discussed with Dr. Anderson    Indications: Jerilyn Chavira is an 68 y.o. female who is having surgery for Basal Cell carcinoma left nose.  This lesion has been neglected over 2 years since recognition with the patient declining surgical intervention until more recently.  After extensive smoking cessation counseling the patient is proceeding with surgical extirpation.    The patient was seen in the preoperative area. The risks, benefits, complications, treatment options, non-operative alternatives, expected recovery and outcomes were discussed with the patient. The possibilities of reaction to medication, pulmonary aspiration, injury to surrounding structures, bleeding, recurrent infection, the need for additional procedures, failure to diagnose a condition, and creating a complication requiring transfusion or operation were discussed with the patient. The patient concurred with the proposed plan, giving informed consent.  The site of surgery was properly noted/marked if necessary per policy. The patient has been actively warmed in preoperative area. Preoperative antibiotics have been ordered and given within 1 hours of incision. Venous thrombosis prophylaxis have been ordered including bilateral sequential compression devices    Procedure Details: The patient was identified in the holding area with  her daughter present.  A team huddle was performed.  Antibiotic was administered on-call to the OR.  She acknowledged she was still smoking and a discussion of case cancellation was reviewed with the patient accepted the risks moving forward since she is already delayed 2 years since recognition of this basal cell carcinoma.  At patient was taken the operating room by Dr. Gonzales and team.  A check-in was observed.  Flowtron's  Function prior to the induction of anesthesia.  General anesthesia with LMA was placed without difficulty.  She was prepped and draped with baby shampoo.  Timeout was observed.  She was given a field block of her nasal dorsum and radix area in each Anish dorsum onto the cheek with a total of 8 cc of 1% lidocaine with epinephrine mixed one-to-one with 0.5 Marcaine plain.  Vasoconstriction was awaited.  An excision of the cutaneous lesion with a clinical margin marked was undertaken.  A suture was placed short stitch superior and long stitch left lateral with Prolene for orientation purposes.  The specimen was marked at the right lateralmost aspect and the caudal and cephalic most aspects with a marking pen on the nose in situ.  The specimen is handcarried for frozen section with Dr. Lim.  The left lateral and inferior margins were positive focally microscopically.  This required a crescentic excision of a few more millimeters which was also oriented with a short stitch inferior and long stitch lateral with the inked margin being the new margin.  This to return with a close josh margin microscopically.  A third excision in this region was undertaken and sent for permanent section with the inked margin as the free margin.  Hemostasis was achieved with local and Bovie coagulation bipolar cautery 2.5 loupe magnification.  Throughout the procedure was surgically assistant was required for exposure, hemostasis, retraction, and patient's safety.  The bilobed flap was marked based based on the left  lateral aspect.  A backcut which was required to go into the radix region was also marked.  The skin flap was incised this was extensively undermined circumferentially on the nose and cephalically under the flap domains.  Retention sutures of 5-0 Prolene were placed.  Small areas of dogears were resected at the apical left Anish dorsum and in the most lateral area of the alar crease. Interrupted buried 5-0 Monocryl sutures were placed in the muscle repair of the radix.  Hemostasis was pristine.  The skin was closed with running 5-0 fast-absorbing plain gut suture without tension.  The patient was cleansed with Betasept and blood.  She was transported on to the waiting gurney and then extubated.  She was transported in satisfactory condition to postanesthesia care unit.  Blood loss was 10 cc.  Complications:  None; patient tolerated the procedure well.    Disposition: PACU - hemodynamically stable.  Condition: stable     This procedure was not performed to treat primary cutaneous melanoma through wide local excision      Additional Details:     Attending Attestation: I performed the procedure.    Matti Cheek  Phone Number: [phone]

## 2024-08-21 LAB
LABORATORY COMMENT REPORT: NORMAL
Lab: NORMAL
PATH REPORT.FINAL DX SPEC: NORMAL
PATH REPORT.GROSS SPEC: NORMAL
PATH REPORT.RELEVANT HX SPEC: NORMAL
PATH REPORT.TOTAL CANCER: NORMAL

## 2025-06-16 ENCOUNTER — HOSPITAL ENCOUNTER (OUTPATIENT)
Dept: RADIOLOGY | Facility: HOSPITAL | Age: 70
Discharge: HOME | End: 2025-06-16
Payer: MEDICARE

## 2025-06-16 DIAGNOSIS — Z12.31 ENCOUNTER FOR SCREENING MAMMOGRAM FOR MALIGNANT NEOPLASM OF BREAST: ICD-10-CM

## 2025-06-16 PROCEDURE — 77067 SCR MAMMO BI INCL CAD: CPT

## 2025-06-16 PROCEDURE — 77067 SCR MAMMO BI INCL CAD: CPT | Performed by: RADIOLOGY

## 2025-06-16 PROCEDURE — 77063 BREAST TOMOSYNTHESIS BI: CPT | Performed by: RADIOLOGY

## 2025-07-07 ENCOUNTER — LAB REQUISITION (OUTPATIENT)
Dept: LAB | Facility: HOSPITAL | Age: 70
End: 2025-07-07
Payer: MEDICARE

## 2025-07-07 ENCOUNTER — TELEPHONE (OUTPATIENT)
Dept: SURGERY | Facility: CLINIC | Age: 70
End: 2025-07-07
Payer: MEDICARE

## 2025-07-07 DIAGNOSIS — Z12.11 ENCOUNTER FOR SCREENING FOR MALIGNANT NEOPLASM OF COLON: ICD-10-CM

## 2025-07-07 PROCEDURE — 88341 IMHCHEM/IMCYTCHM EA ADD ANTB: CPT

## 2025-07-07 PROCEDURE — 88305 TISSUE EXAM BY PATHOLOGIST: CPT

## 2025-07-07 PROCEDURE — 88342 IMHCHEM/IMCYTCHM 1ST ANTB: CPT

## 2025-07-08 DIAGNOSIS — R52 PAIN, UNSPECIFIED: Primary | ICD-10-CM

## 2025-07-09 ENCOUNTER — HOSPITAL ENCOUNTER (OUTPATIENT)
Dept: RADIOLOGY | Facility: HOSPITAL | Age: 70
Discharge: HOME | End: 2025-07-09
Payer: MEDICARE

## 2025-07-09 ENCOUNTER — LAB (OUTPATIENT)
Dept: LAB | Facility: HOSPITAL | Age: 70
End: 2025-07-09
Payer: MEDICARE

## 2025-07-09 DIAGNOSIS — R52 PAIN, UNSPECIFIED: ICD-10-CM

## 2025-07-09 DIAGNOSIS — C18.2 MALIGNANT NEOPLASM OF ASCENDING COLON (MULTI): ICD-10-CM

## 2025-07-09 LAB
CREAT SERPL-MCNC: 0.73 MG/DL (ref 0.5–1.05)
EGFRCR SERPLBLD CKD-EPI 2021: 89 ML/MIN/1.73M*2

## 2025-07-09 PROCEDURE — 71260 CT THORAX DX C+: CPT

## 2025-07-09 PROCEDURE — 82565 ASSAY OF CREATININE: CPT

## 2025-07-09 PROCEDURE — 2550000001 HC RX 255 CONTRASTS

## 2025-07-09 PROCEDURE — 74177 CT ABD & PELVIS W/CONTRAST: CPT | Performed by: RADIOLOGY

## 2025-07-09 PROCEDURE — 71260 CT THORAX DX C+: CPT | Performed by: RADIOLOGY

## 2025-07-09 RX ADMIN — IOHEXOL 75 ML: 350 INJECTION, SOLUTION INTRAVENOUS at 16:27

## 2025-07-10 LAB
LAB AP ASR DISCLAIMER: NORMAL
LABORATORY COMMENT REPORT: NORMAL
PATH REPORT.ADDENDUM SPEC: NORMAL
PATH REPORT.FINAL DX SPEC: NORMAL
PATH REPORT.GROSS SPEC: NORMAL
PATH REPORT.RELEVANT HX SPEC: NORMAL
PATH REPORT.TOTAL CANCER: NORMAL
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
## 2025-07-11 PROBLEM — G93.32 CHRONIC FATIGUE SYNDROME: Status: ACTIVE | Noted: 2025-07-11

## 2025-07-11 PROBLEM — S49.92XA UNSPECIFIED INJURY OF LEFT SHOULDER AND UPPER ARM, INITIAL ENCOUNTER: Status: ACTIVE | Noted: 2023-06-04

## 2025-07-11 PROBLEM — S42.209A CLOSED FRACTURE OF PROXIMAL END OF HUMERUS: Status: ACTIVE | Noted: 2023-06-04

## 2025-07-11 PROBLEM — Z51.89 ENCOUNTER FOR OTHER SPECIFIED AFTERCARE: Status: ACTIVE | Noted: 2023-07-13

## 2025-07-11 PROBLEM — M81.0 AGE-RELATED OSTEOPOROSIS WITHOUT CURRENT PATHOLOGICAL FRACTURE: Status: ACTIVE | Noted: 2025-07-11

## 2025-07-11 PROBLEM — R87.810 PAPANICOLAOU SMEAR OF CERVIX WITH POSITIVE HIGH RISK HUMAN PAPILLOMA VIRUS (HPV) TEST: Status: ACTIVE | Noted: 2025-07-11

## 2025-07-11 PROBLEM — E78.2 MIXED HYPERLIPIDEMIA: Status: ACTIVE | Noted: 2025-07-11

## 2025-07-11 PROBLEM — E55.9 VITAMIN D DEFICIENCY: Status: ACTIVE | Noted: 2025-07-11

## 2025-07-11 PROBLEM — I10 ESSENTIAL HYPERTENSION: Status: ACTIVE | Noted: 2025-07-11

## 2025-07-11 PROBLEM — M25.512 PAIN IN LEFT SHOULDER: Status: ACTIVE | Noted: 2023-06-04
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
## 2025-07-11 PROBLEM — Z88.2 ALLERGY STATUS TO SULFONAMIDES: Status: ACTIVE | Noted: 2023-06-04

## 2025-07-11 RX ORDER — IBANDRONATE SODIUM 150 MG/1
TABLET, FILM COATED ORAL
COMMUNITY
End: 2025-07-23 | Stop reason: WASHOUT

## 2025-07-23 ENCOUNTER — OFFICE VISIT (OUTPATIENT)
Dept: SURGERY | Facility: CLINIC | Age: 70
End: 2025-07-23
Payer: MEDICARE

## 2025-07-23 VITALS
HEIGHT: 66 IN | SYSTOLIC BLOOD PRESSURE: 160 MMHG | OXYGEN SATURATION: 98 % | BODY MASS INDEX: 23.63 KG/M2 | DIASTOLIC BLOOD PRESSURE: 80 MMHG | HEART RATE: 84 BPM | TEMPERATURE: 98.2 F | WEIGHT: 147 LBS

## 2025-07-23 DIAGNOSIS — C18.9 MALIGNANT NEOPLASM OF COLON, UNSPECIFIED PART OF COLON (MULTI): Primary | ICD-10-CM

## 2025-07-23 DIAGNOSIS — C18.2 MALIGNANT NEOPLASM OF ASCENDING COLON (MULTI): ICD-10-CM

## 2025-07-23 DIAGNOSIS — E27.9 LESION OF ADRENAL GLAND (MULTI): ICD-10-CM

## 2025-07-23 PROCEDURE — 3008F BODY MASS INDEX DOCD: CPT | Performed by: STUDENT IN AN ORGANIZED HEALTH CARE EDUCATION/TRAINING PROGRAM

## 2025-07-23 PROCEDURE — 3077F SYST BP >= 140 MM HG: CPT | Performed by: STUDENT IN AN ORGANIZED HEALTH CARE EDUCATION/TRAINING PROGRAM

## 2025-07-23 PROCEDURE — 1159F MED LIST DOCD IN RCRD: CPT | Performed by: STUDENT IN AN ORGANIZED HEALTH CARE EDUCATION/TRAINING PROGRAM

## 2025-07-23 PROCEDURE — G2211 COMPLEX E/M VISIT ADD ON: HCPCS | Performed by: STUDENT IN AN ORGANIZED HEALTH CARE EDUCATION/TRAINING PROGRAM

## 2025-07-23 PROCEDURE — 99214 OFFICE O/P EST MOD 30 MIN: CPT | Performed by: STUDENT IN AN ORGANIZED HEALTH CARE EDUCATION/TRAINING PROGRAM

## 2025-07-23 PROCEDURE — 1125F AMNT PAIN NOTED PAIN PRSNT: CPT | Performed by: STUDENT IN AN ORGANIZED HEALTH CARE EDUCATION/TRAINING PROGRAM

## 2025-07-23 PROCEDURE — 99204 OFFICE O/P NEW MOD 45 MIN: CPT | Performed by: STUDENT IN AN ORGANIZED HEALTH CARE EDUCATION/TRAINING PROGRAM

## 2025-07-23 PROCEDURE — 3079F DIAST BP 80-89 MM HG: CPT | Performed by: STUDENT IN AN ORGANIZED HEALTH CARE EDUCATION/TRAINING PROGRAM

## 2025-07-23 RX ORDER — METRONIDAZOLE 500 MG/100ML
500 INJECTION, SOLUTION INTRAVENOUS ONCE
OUTPATIENT
Start: 2025-07-23 | End: 2025-07-23

## 2025-07-23 RX ORDER — ACETAMINOPHEN 500 MG
1000 TABLET ORAL ONCE
OUTPATIENT
Start: 2025-07-23 | End: 2025-07-23

## 2025-07-23 RX ORDER — HEPARIN SODIUM 5000 [USP'U]/ML
5000 INJECTION, SOLUTION INTRAVENOUS; SUBCUTANEOUS ONCE
OUTPATIENT
Start: 2025-07-23 | End: 2025-07-23

## 2025-07-23 RX ORDER — CEFAZOLIN SODIUM 2 G/100ML
2 INJECTION, SOLUTION INTRAVENOUS ONCE
OUTPATIENT
Start: 2025-07-23 | End: 2025-07-23

## 2025-07-23 SDOH — ECONOMIC STABILITY: FOOD INSECURITY: WITHIN THE PAST 12 MONTHS, YOU WORRIED THAT YOUR FOOD WOULD RUN OUT BEFORE YOU GOT MONEY TO BUY MORE.: NEVER TRUE

## 2025-07-23 SDOH — ECONOMIC STABILITY: FOOD INSECURITY: WITHIN THE PAST 12 MONTHS, THE FOOD YOU BOUGHT JUST DIDN'T LAST AND YOU DIDN'T HAVE MONEY TO GET MORE.: NEVER TRUE

## 2025-07-23 ASSESSMENT — ENCOUNTER SYMPTOMS
ADENOPATHY: 0
HEADACHES: 0
BRUISES/BLEEDS EASILY: 0
SHORTNESS OF BREATH: 0
SPEECH DIFFICULTY: 0
PALPITATIONS: 0
HEMATURIA: 0
DIARRHEA: 0
WOUND: 0
VOICE CHANGE: 0
BLOOD IN STOOL: 0
CHILLS: 0
DYSURIA: 0
FEVER: 0
SORE THROAT: 0
FACIAL ASYMMETRY: 0
TROUBLE SWALLOWING: 0
NAUSEA: 0
UNEXPECTED WEIGHT CHANGE: 0
CHEST TIGHTNESS: 0
ABDOMINAL PAIN: 1
ARTHRALGIAS: 0
VOMITING: 0

## 2025-07-23 ASSESSMENT — PAIN SCALES - GENERAL: PAINLEVEL_OUTOF10: 1

## 2025-07-23 ASSESSMENT — PATIENT HEALTH QUESTIONNAIRE - PHQ9
1. LITTLE INTEREST OR PLEASURE IN DOING THINGS: NOT AT ALL
2. FEELING DOWN, DEPRESSED OR HOPELESS: NOT AT ALL
SUM OF ALL RESPONSES TO PHQ9 QUESTIONS 1 AND 2: 0

## 2025-07-23 NOTE — LETTER
2025     Leonrad Dougherty MD  [address]  [address]  [place] [address]    Patient: Jerilyn Chavira   YOB: 1955   Date of Visit: 2025       Dear Dr. Leonard Dougherty MD:    Thank you for referring Jerilyn Chavira to me for evaluation. Below are my notes for this consultation.  If you have questions, please do not hesitate to call me. I look forward to following your patient along with you.       Sincerely,     Lety Somers MD      CC: No Recipients  ______________________________________________________________________________________    History Of Present Illness  Jerilyn Chavira is a 69 y.o. female presenting for evaluation of a new colon cancer.  She reports she had a negative Cologuard 5 years ago.  It was repeated this year and came back abnormal and she underwent a subsequent colonoscopy which showed a malignant appearing tumor in the ascending colon.  She had never had a colonoscopy before.  No family history of colorectal cancer or IBD.  reports 5 days prior to the colonoscopy she noticed some cramping on the right side of her abdomen but denies any other symptoms.  No blood per rectum.  No nausea or vomiting.  She has previously had a  via lower midline incision.     Past Medical History  She has a past medical history of Breast cyst (), HTN (hypertension), Skin cancer, and Smoker.    Surgical History  She has a past surgical history that includes Breast cyst excision (Left);  section, low transverse (); Skin cancer excision; Bunionectomy; Cyst Removal (Right); and Breast biopsy ().     Social History  She reports that she has been smoking cigarettes. She has never used smokeless tobacco. She reports current alcohol use. She reports that she does not use drugs.    Family History  Family History[1]     Allergies  Aspirin, Bleach (sodium hypochlorite), Egg yolk, Bacitracin, and Neosporin (umd-tss-remgw) [neomycin-bacitracnzn-polymyxnb]    Review of Systems    Constitutional:  Negative for chills, fever and unexpected weight change.   HENT:  Negative for sneezing, sore throat, trouble swallowing and voice change.    Respiratory:  Negative for chest tightness and shortness of breath.    Cardiovascular:  Negative for chest pain and palpitations.   Gastrointestinal:  Positive for abdominal pain. Negative for blood in stool, diarrhea, nausea and vomiting.   Endocrine: Negative for cold intolerance and heat intolerance.   Genitourinary:  Negative for decreased urine volume, dysuria and hematuria.   Musculoskeletal:  Negative for arthralgias and gait problem.   Skin:  Negative for rash and wound.   Neurological:  Negative for facial asymmetry, speech difficulty and headaches.   Hematological:  Negative for adenopathy. Does not bruise/bleed easily.   Psychiatric/Behavioral:  Negative for self-injury and suicidal ideas.         Physical Exam  Vitals and nursing note reviewed.   Constitutional:       Appearance: Normal appearance.   HENT:      Head: Normocephalic and atraumatic.      Mouth/Throat:      Mouth: Mucous membranes are moist.      Pharynx: Oropharynx is clear.     Eyes:      Extraocular Movements: Extraocular movements intact.      Pupils: Pupils are equal, round, and reactive to light.       Cardiovascular:      Rate and Rhythm: Normal rate and regular rhythm.      Pulses: Normal pulses.   Pulmonary:      Effort: Pulmonary effort is normal.      Breath sounds: Normal breath sounds.   Abdominal:      General: There is no distension.      Palpations: Abdomen is soft.      Tenderness: There is no abdominal tenderness.      Comments: Well-healed lower midline incision     Musculoskeletal:      Cervical back: Normal range of motion and neck supple.     Skin:     General: Skin is warm and dry.     Neurological:      General: No focal deficit present.      Mental Status: She is alert and oriented to person, place, and time.     Psychiatric:         Mood and Affect: Mood  "normal.         Behavior: Behavior normal.          Last Recorded Vitals  Blood pressure 160/80, pulse 84, temperature 36.8 °C (98.2 °F), temperature source Oral, height 1.676 m (5' 6\"), weight 66.7 kg (147 lb), SpO2 98%.    Relevant Results  7/7/25-       Component    FINAL DIAGNOSIS   A. COLON, ASCENDING, MASS:   -- Invasive adenocarcinoma (see note).        Addendum   MISMATCH REPAIR PROTEIN EXPRESSION                    Protein:  Result                    MLH-1:             Intact                                                PMS-2:            Intact                                                MSH-2:            Intact                                                MSH-6:            Intact        7/9/25 CT CAP IMPRESSION:  Ascending colon adenocarcinoma initial staging scan:  1. Nodular thickening of the proximal ascending colon/cecum  correlates to patient's known primary neoplasm. Mildly enlarged right  lower quadrant mesenteric lymph node and subcentimeter right external  lymph node as described above. This lymph nodes bears watching on  follow-up scan as metastasis is not completely ruled. No measurable  disease in the chest.  2. Background moderate-to-severe centrilobular and paraseptal  emphysema with findings of chronic bronchitis.  3. Subpleural reticulation with cystic changes predominantly in the  bilateral lung bases can be seen with smoking related interstitial  lung changes.  4. Indeterminate right adrenal gland nodule, bears watching on  follow-up scan in 8-12 weeks. If a PET-CT is obtained for evaluating  the above-mentioned prominent lymph nodes, would also help in  characterizing the right adrenal gland nodule. In view of lack of  extensive metastatic disease, adrenal gland metastasis is less likely  though not completely ruled out. Comparison with outside prior if  available should be obtained.  5. Additional incidental non-acute findings as detailed above.     Assessment/Plan  Problem List " Items Addressed This Visit           ICD-10-CM    Malignant neoplasm of ascending colon (Multi) C18.2    Relevant Orders    Case Request Operating Room: RESECTION, INTESTINE, ROBOT-ASSISTED (Completed)     Other Visit Diagnoses         Codes      Malignant neoplasm of colon, unspecified part of colon (Multi)    -  Primary C18.9    Relevant Orders    NM PET CT whole body      Lesion of adrenal gland (Multi)     E27.9            We discussed the etiology and pathophysiology of colon cancer.  We discussed the different treatment options including chemotherapy, radiation, surgery and a combination of the 3 depending on the cancer location and stage.     She has already completed her staging CT scan which did not show any distant metastatic disease to the liver and lungs.  Did have a nondescript adrenal nodule and I have ordered a PET scan to further evaluate this.    She reports she did have a CEA drawn, this order is not in the computer and is likely sent through Dr. Dougherty office. Will obtain this report.  Discussed how this will be monitored and trended over time.    I have recommended an upfront surgical resection for the right colon cancer.  I explained an oncologic resection via robotic assisted right colectomy possible open using a drawing in clinic.  Explained the risks including infection, anastomotic leak, need for ostomy.  Explained the postoperative expectations in terms of pain control, wound care and lifting restrictions.  Explained that the specimen will be sent to the lab for complete analysis, and this will determine if any future treatment is necessary.    All questions were answered.  Will schedule surgery today.  I will notify her once the PET scan have results if this alters our surgical plan.        Lety Somers MD         [1]  No family history on file.       [1]  No family history on file.

## 2025-07-23 NOTE — PROGRESS NOTES
History Of Present Illness  Jerilyn Chavira is a 69 y.o. female presenting for evaluation of a new colon cancer.  She reports she had a negative Cologuard 5 years ago.  It was repeated this year and came back abnormal and she underwent a subsequent colonoscopy which showed a malignant appearing tumor in the ascending colon.  She had never had a colonoscopy before.  No family history of colorectal cancer or IBD.  reports 5 days prior to the colonoscopy she noticed some cramping on the right side of her abdomen but denies any other symptoms.  No blood per rectum.  No nausea or vomiting.  She has previously had a  via lower midline incision.     Past Medical History  She has a past medical history of Breast cyst (), HTN (hypertension), Skin cancer, and Smoker.    Surgical History  She has a past surgical history that includes Breast cyst excision (Left);  section, low transverse (); Skin cancer excision; Bunionectomy; Cyst Removal (Right); and Breast biopsy ().     Social History  She reports that she has been smoking cigarettes. She has never used smokeless tobacco. She reports current alcohol use. She reports that she does not use drugs.    Family History  Family History[1]     Allergies  Aspirin, Bleach (sodium hypochlorite), Egg yolk, Bacitracin, and Neosporin (uxz-yfp-arxep) [neomycin-bacitracnzn-polymyxnb]    Review of Systems   Constitutional:  Negative for chills, fever and unexpected weight change.   HENT:  Negative for sneezing, sore throat, trouble swallowing and voice change.    Respiratory:  Negative for chest tightness and shortness of breath.    Cardiovascular:  Negative for chest pain and palpitations.   Gastrointestinal:  Positive for abdominal pain. Negative for blood in stool, diarrhea, nausea and vomiting.   Endocrine: Negative for cold intolerance and heat intolerance.   Genitourinary:  Negative for decreased urine volume, dysuria and hematuria.   Musculoskeletal:   "Negative for arthralgias and gait problem.   Skin:  Negative for rash and wound.   Neurological:  Negative for facial asymmetry, speech difficulty and headaches.   Hematological:  Negative for adenopathy. Does not bruise/bleed easily.   Psychiatric/Behavioral:  Negative for self-injury and suicidal ideas.         Physical Exam  Vitals and nursing note reviewed.   Constitutional:       Appearance: Normal appearance.   HENT:      Head: Normocephalic and atraumatic.      Mouth/Throat:      Mouth: Mucous membranes are moist.      Pharynx: Oropharynx is clear.     Eyes:      Extraocular Movements: Extraocular movements intact.      Pupils: Pupils are equal, round, and reactive to light.       Cardiovascular:      Rate and Rhythm: Normal rate and regular rhythm.      Pulses: Normal pulses.   Pulmonary:      Effort: Pulmonary effort is normal.      Breath sounds: Normal breath sounds.   Abdominal:      General: There is no distension.      Palpations: Abdomen is soft.      Tenderness: There is no abdominal tenderness.      Comments: Well-healed lower midline incision     Musculoskeletal:      Cervical back: Normal range of motion and neck supple.     Skin:     General: Skin is warm and dry.     Neurological:      General: No focal deficit present.      Mental Status: She is alert and oriented to person, place, and time.     Psychiatric:         Mood and Affect: Mood normal.         Behavior: Behavior normal.          Last Recorded Vitals  Blood pressure 160/80, pulse 84, temperature 36.8 °C (98.2 °F), temperature source Oral, height 1.676 m (5' 6\"), weight 66.7 kg (147 lb), SpO2 98%.    Relevant Results  7/7/25-       Component    FINAL DIAGNOSIS   A. COLON, ASCENDING, MASS:   -- Invasive adenocarcinoma (see note).        Addendum   MISMATCH REPAIR PROTEIN EXPRESSION                    Protein:  Result                    MLH-1:             Intact                                                PMS-2:            Intact       "                                          MSH-2:            Intact                                                MSH-6:            Intact        7/9/25 CT CAP IMPRESSION:  Ascending colon adenocarcinoma initial staging scan:  1. Nodular thickening of the proximal ascending colon/cecum  correlates to patient's known primary neoplasm. Mildly enlarged right  lower quadrant mesenteric lymph node and subcentimeter right external  lymph node as described above. This lymph nodes bears watching on  follow-up scan as metastasis is not completely ruled. No measurable  disease in the chest.  2. Background moderate-to-severe centrilobular and paraseptal  emphysema with findings of chronic bronchitis.  3. Subpleural reticulation with cystic changes predominantly in the  bilateral lung bases can be seen with smoking related interstitial  lung changes.  4. Indeterminate right adrenal gland nodule, bears watching on  follow-up scan in 8-12 weeks. If a PET-CT is obtained for evaluating  the above-mentioned prominent lymph nodes, would also help in  characterizing the right adrenal gland nodule. In view of lack of  extensive metastatic disease, adrenal gland metastasis is less likely  though not completely ruled out. Comparison with outside prior if  available should be obtained.  5. Additional incidental non-acute findings as detailed above.     Assessment/Plan   Problem List Items Addressed This Visit           ICD-10-CM    Malignant neoplasm of ascending colon (Multi) C18.2    Relevant Orders    Case Request Operating Room: RESECTION, INTESTINE, ROBOT-ASSISTED (Completed)     Other Visit Diagnoses         Codes      Malignant neoplasm of colon, unspecified part of colon (Multi)    -  Primary C18.9    Relevant Orders    NM PET CT whole body      Lesion of adrenal gland (Multi)     E27.9            We discussed the etiology and pathophysiology of colon cancer.  We discussed the different treatment options including chemotherapy,  radiation, surgery and a combination of the 3 depending on the cancer location and stage.     She has already completed her staging CT scan which did not show any distant metastatic disease to the liver and lungs.  Did have a nondescript adrenal nodule and I have ordered a PET scan to further evaluate this.    She reports she did have a CEA drawn, this order is not in the computer and is likely sent through Dr. Dougherty office. Will obtain this report.  Discussed how this will be monitored and trended over time.    I have recommended an upfront surgical resection for the right colon cancer.  I explained an oncologic resection via robotic assisted right colectomy possible open using a drawing in clinic.  Explained the risks including infection, anastomotic leak, need for ostomy.  Explained the postoperative expectations in terms of pain control, wound care and lifting restrictions.  Explained that the specimen will be sent to the lab for complete analysis, and this will determine if any future treatment is necessary.    All questions were answered.  Will schedule surgery today.  I will notify her once the PET scan have results if this alters our surgical plan.        Lety Somers MD         [1] No family history on file.

## 2025-07-24 ENCOUNTER — HOSPITAL ENCOUNTER (OUTPATIENT)
Dept: RADIOLOGY | Facility: CLINIC | Age: 70
Discharge: HOME | End: 2025-07-24
Payer: MEDICARE

## 2025-07-24 ENCOUNTER — CLINICAL SUPPORT (OUTPATIENT)
Dept: PREADMISSION TESTING | Facility: HOSPITAL | Age: 70
End: 2025-07-24
Payer: MEDICARE

## 2025-07-24 DIAGNOSIS — C18.9 MALIGNANT NEOPLASM OF COLON, UNSPECIFIED PART OF COLON (MULTI): ICD-10-CM

## 2025-07-24 DIAGNOSIS — C18.2 MALIGNANT NEOPLASM OF ASCENDING COLON (MULTI): Primary | ICD-10-CM

## 2025-07-24 PROCEDURE — A9552 F18 FDG: HCPCS | Performed by: STUDENT IN AN ORGANIZED HEALTH CARE EDUCATION/TRAINING PROGRAM

## 2025-07-24 PROCEDURE — 3430000001 HC RX 343 DIAGNOSTIC RADIOPHARMACEUTICALS: Performed by: STUDENT IN AN ORGANIZED HEALTH CARE EDUCATION/TRAINING PROGRAM

## 2025-07-24 RX ORDER — FLUDEOXYGLUCOSE F 18 200 MCI/ML
11.1 INJECTION, SOLUTION INTRAVENOUS
Status: COMPLETED | OUTPATIENT
Start: 2025-07-24 | End: 2025-07-24

## 2025-07-24 RX ADMIN — FLUDEOXYGLUCOSE F 18 11.1 MILLICURIE: 200 INJECTION, SOLUTION INTRAVENOUS at 09:10

## 2025-07-24 ASSESSMENT — DUKE ACTIVITY SCORE INDEX (DASI)
CAN YOU WALK A BLOCK OR TWO ON LEVEL GROUND: YES
CAN YOU TAKE CARE OF YOURSELF (EAT, DRESS, BATHE, OR USE TOILET): YES
DASI METS SCORE: 8.9
CAN YOU DO YARD WORK LIKE RAKING LEAVES, WEEDING OR PUSHING A MOWER: YES
CAN YOU PARTICIPATE IN MODERATE RECREATIONAL ACTIVITIES LIKE GOLF, BOWLING, DANCING, DOUBLES TENNIS OR THROWING A BASEBALL OR FOOTBALL: YES
CAN YOU PARTICIPATE IN STRENOUS SPORTS LIKE SWIMMING, SINGLES TENNIS, FOOTBALL, BASKETBALL, OR SKIING: YES
CAN YOU WALK INDOORS, SUCH AS AROUND YOUR HOUSE: YES
CAN YOU DO LIGHT WORK AROUND THE HOUSE LIKE DUSTING OR WASHING DISHES: YES
CAN YOU CLIMB A FLIGHT OF STAIRS OR WALK UP A HILL: YES
CAN YOU DO MODERATE WORK AROUND THE HOUSE LIKE VACUUMING, SWEEPING FLOORS OR CARRYING GROCERIES: YES
CAN YOU DO HEAVY WORK AROUND THE HOUSE LIKE SCRUBBING FLOORS OR LIFTING AND MOVING HEAVY FURNITURE: NO
TOTAL_SCORE: 50.2
CAN YOU HAVE SEXUAL RELATIONS: YES
CAN YOU RUN A SHORT DISTANCE: YES

## 2025-07-24 NOTE — CPM/PAT NURSE NOTE
CPM/PAT Nurse Note      Name: Jerilyn Chavira (Jerilyn Chavira)  /Age: 1955/69 y.o.       Medical History[1]    Surgical History[2]    Patient Sexual activity questions deferred to the physician.    Family History[3]    Allergies[4]    Prior to Admission medications   Medication Sig Start Date End Date Taking? Authorizing Provider   calcium carb/vit D3/minerals (CALCIUM-VITAMIN D ORAL) Take 1 tablet by mouth once daily.    Historical Provider, MD   hydroCHLOROthiazide (Microzide) 12.5 mg capsule Take 1 capsule (12.5 mg) by mouth once daily in the morning.    Historical Provider, MD   mupirocin (Bactroban) 2 % ointment Apply to affected area(s) topically 3 times a day.  Patient not taking: Reported on 2025   Matti Cheek MD   acetaminophen (Tylenol) 500 mg tablet Take 1 tablet (500 mg) by mouth every 6 hours if needed for mild pain (1 - 3).  Patient not taking: Reported on 2025  Historical Provider, MD   cholecalciferol (Vitamin D-3) 25 MCG (1000 UT) tablet Take 1 tablet (1,000 Units) by mouth if needed (MOSTLY IN THE WINTER).  Patient not taking: Reported on 2025  Historical Provider, MD   ibandronate (Boniva) 150 mg tablet TAKE 1 TABLET BY MOUTH 60 MINUTES BEFORE THE FIRST FOOD  BEVERAGE OR MEDICINE OF THE DAY WITH PLAIN WATER ONCE  A MONTH  Patient not taking: Reported on 2025  Historical Provider, MD   ondansetron (Zofran) 4 mg tablet Take 1 tablet (4 mg) by mouth every 6 hours if needed for nausea for up to 10 doses.  Patient not taking: Reported on 2025  Matti Cheek MD   oxyCODONE-acetaminophen (Percocet) 5-325 mg tablet Take 1 tablet by mouth every 6 hours if needed for severe pain (7 - 10).  Patient not taking: Reported on 2025  Matti Cheek MD        [facility] ROS     DASI Risk Score      Flowsheet Row Clinical Support from 2025 in [facility] Pre-Admission Testing from 2024 in   [facility]   Can you take care of yourself (eat, dress, bathe, or use toilet)?  2.75 filed at 07/24/2025 1239 2.75 filed at 08/09/2024 0833   Can you walk indoors, such as around your house? 1.75 filed at 07/24/2025 1239 1.75 filed at 08/09/2024 0833   Can you walk a block or two on level ground?  2.75 filed at 07/24/2025 1239 2.75 filed at 08/09/2024 0833   Can you climb a flight of stairs or walk up a hill? 5.5 filed at 07/24/2025 1239 5.5 filed at 08/09/2024 0833   Can you run a short distance? 8 filed at 07/24/2025 1239 8 filed at 08/09/2024 0833   Can you do light work around the house like dusting or washing dishes? 2.7 filed at 07/24/2025 1239 2.7 filed at 08/09/2024 0833   Can you do moderate work around the house like vacuuming, sweeping floors or carrying groceries? 3.5 filed at 07/24/2025 1239 3.5 filed at 08/09/2024 0833   Can you do heavy work around the house like scrubbing floors or lifting and moving heavy furniture?  0 filed at 07/24/2025 1239 8 filed at 08/09/2024 0833   Can you do yard work like raking leaves, weeding or pushing a mower? 4.5 filed at 07/24/2025 1239 4.5 filed at 08/09/2024 0833   Can you have sexual relations? 5.25 filed at 07/24/2025 1239 5.25 filed at 08/09/2024 0833   Can you participate in moderate recreational activities like golf, bowling, dancing, doubles tennis or throwing a baseball or football? 6 filed at 07/24/2025 1239 6 filed at 08/09/2024 0833   Can you participate in strenous sports like swimming, singles tennis, football, basketball, or skiing? 7.5 filed at 07/24/2025 1239 7.5 filed at 08/09/2024 0833   DASI SCORE 50.2 filed at 07/24/2025 1239 58.2 filed at 08/09/2024 0833   METS Score (Will be calculated only when all the questions are answered) 8.9 filed at 07/24/2025 1239 9.9 filed at 08/09/2024 0833     Caprini DVT Assessment    No data to display  Modified Frailty Index    No data to display  [id number] Stroke Risk Points  Current as of just now         N/A 0 to 9 Points:      Last Change: N/A          The [id number] risk score (Lip FABIO, et al. 2009. © 2010 [employer]) quantifies the risk of stroke for a patient with atrial fibrillation. For patients without atrial fibrillation or under the age of 18 this score appears as N/A. Higher score values generally indicate higher risk of stroke.        This score is not applicable to this patient. Components are not calculated.          Revised Cardiac Risk Index      Flowsheet Row Pre-Admission Testing from 8/9/2024 in [facility]   High-Risk Surgery (Intraperitoneal, Intrathoracic,Suprainguinal vascular) 1 filed at 08/09/2024 0824   History of ischemic heart disease (History of MI, History of positive exercuse test, Current chest paint considered due to myocardial ischemia, Use of nitrate therapy, ECG with pathological Q Waves) 0 filed at 08/09/2024 0824   History of congestive heart failure (pulmonary edemia, bilateral rales or S3 gallop, Paroxysmal nocturnal dyspnea, CXR showing pulmonary vascular redistribution) 0 filed at 08/09/2024 0824   History of cerebrovascular disease (Prior TIA or stroke) 0 filed at 08/09/2024 0824   Pre-operative insulin treatment 0 filed at 08/09/2024 0824   Pre-operative creatinine>2 mg/dl 0 filed at 08/09/2024 0824   Revised Cardiac Risk Calculator 1 filed at 08/09/2024 0824     Apfel Simplified Score    No data to display  Risk Analysis Index Results This Encounter    No data found in the last 10 encounters.       Stop Bang Score      Flowsheet Row Clinical Support from 7/24/2025 in [facility] Admission (Discharged) from 8/14/2024 in [facility] OR with Matti Cheek MD   Do you snore loudly? 0 filed at 07/24/2025 1241 0 filed at 08/14/2024 1006   Do you often feel tired or fatigued after your sleep? 0 filed at 07/24/2025 1241 0 filed at 08/14/2024 1006   Has anyone ever observed you stop breathing in your  sleep? 0 filed at 2025 1241 0 filed at 2024 1006   Do you have or are you being treated for high blood pressure? 1 filed at 2025 1241 1 filed at 2024 1006   Recent BMI (Calculated) 23.7 filed at 2025 1241 23 filed at 2024 1006   Is BMI greater than 35 kg/m2? 0=No filed at 2025 1241 0=No filed at 2024 1006   Age older than 50 years old? 1=Yes filed at 2025 1241 1=Yes filed at 2024 1006   Is your neck circumference greater than 17 inches (Male) or 16 inches (Female)? 0 filed at 2025 1241 0 filed at 2024 1006   Gender - Male 0=No filed at 2025 1241 0=No filed at 2024 1006   STOP-BANG Total Score 2 filed at 2025 1241 2 filed at 2024 1006     Prodigy: High Risk  Total Score: 8              Prodigy Age Score           ARISCAT Score for Postoperative Pulmonary Complications    No data to display  [place] Perioperative Risk for Myocardial Infarction or Cardiac Arrest (MEMO)    No data to display      Nurse Plan of Action:   Triage nurse screening call completed.  Pt is scheduled for PAT office appointment on 25 at [facility].    Before placing the call, time was spent reviewing care plans (barriers, goals, etc.), medication lists, previous doctor visits, allergy lists, demographics, insurance information, previous care notes, last appointment, social determinants of health, wellness visits, upcoming appointments and any recent hospital visits.             [1]   Past Medical History:  Diagnosis Date    Breast cyst     Colon cancer (Multi)     HTN (hypertension)     Skin cancer     Smoker    [2]   Past Surgical History:  Procedure Laterality Date    BREAST BIOPSY      BREAST CYST EXCISION Left     15 years ago    BUNIONECTOMY       SECTION, LOW TRANSVERSE      COLONOSCOPY      CYST REMOVAL Right     hand    SKIN CANCER EXCISION      forehead, leg nose   [3]   Family History  Problem Relation Name Age of Onset  "   Cervical cancer Mother      Heart disease Father      Heart disease Sister      Diabetes Sister     [4]   Allergies  Allergen Reactions    Aspirin Nausea/vomiting    Bleach (Sodium Hypochlorite) Shortness of breath     DIFFICULTY BREATHING  EYES BURN SEVERELY    Egg Yolk Anaphylaxis    Bacitracin Dermatitis     \"EATS THE SKIN AWAY\"    Neosporin (Gsu-Ckd-Dhjws) [Neomycin-Bacitracnzn-Polymyxnb] Dermatitis     \"EATS THE SKIN AWAY\"     "

## 2025-07-30 ENCOUNTER — TUMOR BOARD CONFERENCE (OUTPATIENT)
Dept: HEMATOLOGY/ONCOLOGY | Facility: HOSPITAL | Age: 70
End: 2025-07-30
Payer: MEDICARE

## 2025-08-01 ENCOUNTER — APPOINTMENT (OUTPATIENT)
Facility: HOSPITAL | Age: 70
End: 2025-08-01
Payer: MEDICARE

## 2025-08-01 ENCOUNTER — PRE-ADMISSION TESTING (OUTPATIENT)
Dept: PREADMISSION TESTING | Facility: HOSPITAL | Age: 70
End: 2025-08-01
Payer: MEDICARE

## 2025-08-01 VITALS
DIASTOLIC BLOOD PRESSURE: 94 MMHG | OXYGEN SATURATION: 100 % | RESPIRATION RATE: 16 BRPM | SYSTOLIC BLOOD PRESSURE: 157 MMHG | TEMPERATURE: 97.9 F | HEART RATE: 91 BPM | WEIGHT: 147.49 LBS | HEIGHT: 66 IN | BODY MASS INDEX: 23.7 KG/M2

## 2025-08-01 DIAGNOSIS — Z01.818 PRE-OP EXAMINATION: Primary | ICD-10-CM

## 2025-08-01 LAB
ABO GROUP (TYPE) IN BLOOD: NORMAL
ANION GAP SERPL CALCULATED.3IONS-SCNC: 10 MMOL/L (ref 10–20)
ANTIBODY SCREEN: NORMAL
BASOPHILS # BLD AUTO: 0.09 X10*3/UL (ref 0–0.1)
BASOPHILS NFR BLD AUTO: 0.9 %
BUN SERPL-MCNC: 15 MG/DL (ref 6–23)
CALCIUM SERPL-MCNC: 9.7 MG/DL (ref 8.6–10.3)
CHLORIDE SERPL-SCNC: 100 MMOL/L (ref 98–107)
CO2 SERPL-SCNC: 31 MMOL/L (ref 21–32)
CREAT SERPL-MCNC: 1.04 MG/DL (ref 0.5–1.05)
EGFRCR SERPLBLD CKD-EPI 2021: 58 ML/MIN/1.73M*2
EOSINOPHIL # BLD AUTO: 0.11 X10*3/UL (ref 0–0.7)
EOSINOPHIL NFR BLD AUTO: 1.1 %
ERYTHROCYTE [DISTWIDTH] IN BLOOD BY AUTOMATED COUNT: 12.9 % (ref 11.5–14.5)
GLUCOSE SERPL-MCNC: 77 MG/DL (ref 74–99)
HCT VFR BLD AUTO: 39.3 % (ref 36–46)
HGB BLD-MCNC: 12.4 G/DL (ref 12–16)
IMM GRANULOCYTES # BLD AUTO: 0.05 X10*3/UL (ref 0–0.7)
IMM GRANULOCYTES NFR BLD AUTO: 0.5 % (ref 0–0.9)
LYMPHOCYTES # BLD AUTO: 3.24 X10*3/UL (ref 1.2–4.8)
LYMPHOCYTES NFR BLD AUTO: 31.8 %
MCH RBC QN AUTO: 28.7 PG (ref 26–34)
MCHC RBC AUTO-ENTMCNC: 31.6 G/DL (ref 32–36)
MCV RBC AUTO: 91 FL (ref 80–100)
MONOCYTES # BLD AUTO: 0.76 X10*3/UL (ref 0.1–1)
MONOCYTES NFR BLD AUTO: 7.5 %
NEUTROPHILS # BLD AUTO: 5.93 X10*3/UL (ref 1.2–7.7)
NEUTROPHILS NFR BLD AUTO: 58.2 %
NRBC BLD-RTO: 0 /100 WBCS (ref 0–0)
PLATELET # BLD AUTO: 404 X10*3/UL (ref 150–450)
POTASSIUM SERPL-SCNC: 4.3 MMOL/L (ref 3.5–5.3)
RBC # BLD AUTO: 4.32 X10*6/UL (ref 4–5.2)
RH FACTOR (ANTIGEN D): NORMAL
SODIUM SERPL-SCNC: 137 MMOL/L (ref 136–145)
WBC # BLD AUTO: 10.2 X10*3/UL (ref 4.4–11.3)

## 2025-08-01 PROCEDURE — 36415 COLL VENOUS BLD VENIPUNCTURE: CPT

## 2025-08-01 PROCEDURE — 85025 COMPLETE CBC W/AUTO DIFF WBC: CPT

## 2025-08-01 PROCEDURE — 87081 CULTURE SCREEN ONLY: CPT | Mod: WESLAB

## 2025-08-01 PROCEDURE — 99214 OFFICE O/P EST MOD 30 MIN: CPT

## 2025-08-01 PROCEDURE — 80048 BASIC METABOLIC PNL TOTAL CA: CPT

## 2025-08-01 PROCEDURE — 86900 BLOOD TYPING SEROLOGIC ABO: CPT

## 2025-08-01 RX ORDER — IBUPROFEN 200 MG
200 TABLET ORAL EVERY 6 HOURS PRN
Status: ON HOLD | COMMUNITY

## 2025-08-01 RX ORDER — CHLORHEXIDINE GLUCONATE ORAL RINSE 1.2 MG/ML
SOLUTION DENTAL
Qty: 473 ML | Refills: 0 | Status: ON HOLD | OUTPATIENT
Start: 2025-08-01 | End: 2025-08-08

## 2025-08-01 ASSESSMENT — DUKE ACTIVITY SCORE INDEX (DASI)
CAN YOU PARTICIPATE IN STRENOUS SPORTS LIKE SWIMMING, SINGLES TENNIS, FOOTBALL, BASKETBALL, OR SKIING: YES
CAN YOU DO MODERATE WORK AROUND THE HOUSE LIKE VACUUMING, SWEEPING FLOORS OR CARRYING GROCERIES: YES
CAN YOU DO HEAVY WORK AROUND THE HOUSE LIKE SCRUBBING FLOORS OR LIFTING AND MOVING HEAVY FURNITURE: YES
DASI METS SCORE: 9.9
TOTAL_SCORE: 58.2
CAN YOU RUN A SHORT DISTANCE: YES
CAN YOU DO LIGHT WORK AROUND THE HOUSE LIKE DUSTING OR WASHING DISHES: YES
CAN YOU CLIMB A FLIGHT OF STAIRS OR WALK UP A HILL: YES
CAN YOU WALK A BLOCK OR TWO ON LEVEL GROUND: YES
CAN YOU WALK INDOORS, SUCH AS AROUND YOUR HOUSE: YES
CAN YOU TAKE CARE OF YOURSELF (EAT, DRESS, BATHE, OR USE TOILET): YES
CAN YOU HAVE SEXUAL RELATIONS: YES
CAN YOU PARTICIPATE IN MODERATE RECREATIONAL ACTIVITIES LIKE GOLF, BOWLING, DANCING, DOUBLES TENNIS OR THROWING A BASEBALL OR FOOTBALL: YES
CAN YOU DO YARD WORK LIKE RAKING LEAVES, WEEDING OR PUSHING A MOWER: YES

## 2025-08-01 ASSESSMENT — ENCOUNTER SYMPTOMS
ENDOCRINE NEGATIVE: 1
RESPIRATORY NEGATIVE: 1
CONSTITUTIONAL NEGATIVE: 1
ALLERGIC/IMMUNOLOGIC NEGATIVE: 1
MUSCULOSKELETAL NEGATIVE: 1
GASTROINTESTINAL NEGATIVE: 1
NEUROLOGICAL NEGATIVE: 1
EYES NEGATIVE: 1
CARDIOVASCULAR NEGATIVE: 1
PSYCHIATRIC NEGATIVE: 1
HEMATOLOGIC/LYMPHATIC NEGATIVE: 1

## 2025-08-01 ASSESSMENT — PAIN - FUNCTIONAL ASSESSMENT: PAIN_FUNCTIONAL_ASSESSMENT: 0-10

## 2025-08-01 ASSESSMENT — PAIN SCALES - GENERAL: PAINLEVEL_OUTOF10: 0 - NO PAIN

## 2025-08-01 NOTE — CPM/PAT H&P
CPM/PAT Evaluation       Name: Jerilyn Chavira (Jerilyn Chavira)  /Age: 1955/69 y.o.     In-Person       Chief Complaint: Colon CA    HPI: Jerilyn Chavira is a 69 year old female who was recently diagnosed with colon cancer. She had a positive cologuard that lead to a colonoscopy. Her colonoscopy revealed a malignant appearing tumor in the ascending colon.  She denies blood in the stool, constipation, diarrhea, abdominal pain,  or unintentional weight loss. She states she has no family history of colon cancer. She is scheduled for a resection of the intestine. She denies fever, chills, chest pain, SOB, dizziness,and palpitations.     Medical History[1]    Surgical History[2]    Social History     Tobacco Use    Smoking status: Former     Current packs/day: 0.00     Average packs/day: 1 pack/day for 40.0 years (40.0 ttl pk-yrs)     Types: Cigarettes     Start date:      Quit date:      Years since quittin.5    Smokeless tobacco: Never    Tobacco comments:     Has started and stopped multiple times   Substance Use Topics    Alcohol use: Yes     Comment: SOCIALLY/RARELY     Social History     Substance and Sexual Activity   Drug Use Never         Family History[3]    Allergies[4]  Current Outpatient Medications   Medication Sig Dispense Refill    calcium carb/vit D3/minerals (CALCIUM-VITAMIN D ORAL) Take 1 tablet by mouth once daily.      chlorhexidine (Peridex) 0.12 % solution Use as directed 473 mL 0    hydroCHLOROthiazide (Microzide) 12.5 mg capsule Take 1 capsule (12.5 mg) by mouth once daily in the morning.      ibuprofen 200 mg tablet Take 1 tablet (200 mg) by mouth every 6 hours if needed for mild pain (1 - 3).      metroNIDAZOLE (Flagyl) 500 mg tablet Take 1 tablet (500 mg) by mouth 3 times a day for 3 doses. Take 1 tablet at 1:00 PM, 2:00 PM and 10:00 PM the day before surgery 3 tablet 0    mupirocin (Bactroban) 2 % ointment Apply to affected area(s) topically 3 times a day. (Patient not  taking: Reported on 8/1/2025) 22 g 1    neomycin (Mycifradin) 500 mg tablet Take two tablets by mouth at 1:00pm, 2:00pm and 10:00pm on the day prior to surgery 6 tablet 0     No current facility-administered medications for this visit.          Review of Systems   Constitutional: Negative.    HENT: Negative.     Eyes: Negative.    Respiratory: Negative.     Cardiovascular: Negative.    Gastrointestinal: Negative.    Endocrine: Negative.    Genitourinary: Negative.    Musculoskeletal: Negative.    Skin: Negative.    Allergic/Immunologic: Negative.    Neurological: Negative.    Hematological: Negative.    Psychiatric/Behavioral: Negative.             Physical Exam  Vitals reviewed.   Constitutional:       Appearance: Normal appearance.   HENT:      Head: Normocephalic and atraumatic.      Nose: Nose normal.      Mouth/Throat:      Mouth: Mucous membranes are moist.      Pharynx: Oropharynx is clear.     Eyes:      Extraocular Movements: Extraocular movements intact.      Conjunctiva/sclera: Conjunctivae normal.       Cardiovascular:      Rate and Rhythm: Normal rate and regular rhythm.      Pulses: Normal pulses.      Heart sounds: Normal heart sounds.   Pulmonary:      Effort: Pulmonary effort is normal.      Breath sounds: Normal breath sounds.   Abdominal:      Palpations: Abdomen is soft.   Genitourinary:     Comments: Assessment deferred to physicians    Musculoskeletal:         General: Normal range of motion.      Cervical back: Normal range of motion and neck supple.     Skin:     General: Skin is warm and dry.     Neurological:      General: No focal deficit present.      Mental Status: She is alert and oriented to person, place, and time.     Psychiatric:         Mood and Affect: Mood normal.         Behavior: Behavior normal.         Thought Content: Thought content normal.         Judgment: Judgment normal.          PAT AIRWAY:   Airway:     Mallampati::  II    TM distance::  >3 FB    Neck ROM::   "Full  normal            Visit Vitals  BP (!) 157/94   Pulse 91   Temp 36.6 °C (97.9 °F) (Temporal)   Resp 16   Ht 1.676 m (5' 6\")   Wt 66.9 kg (147 lb 7.8 oz)   SpO2 100%   BMI 23.81 kg/m²   OB Status Postmenopausal   Smoking Status Former   BSA 1.76 m²     ASA: II  CHADS2: 2.8%  RCRI: 0.9%  DASI:50.2  METS:8.9  STOP BAN        Assessment and Plan:     Malignant neoplasm of ascending colon : RESECTION, INTESTINE, ROBOT-ASSISTED   Hypertension: Hydrochlorothiazide    LABS: CBC, BMP, Type and Screen, MRSA    Chloe Ochoa, APRN-CNP               [1]   Past Medical History:  Diagnosis Date    Breast cyst     Colon cancer (Multi)     HTN (hypertension)     Skin cancer     Smoker    [2]   Past Surgical History:  Procedure Laterality Date    BREAST BIOPSY      BREAST CYST EXCISION Left     15 years ago    BUNIONECTOMY       SECTION, LOW TRANSVERSE      COLONOSCOPY      CYST REMOVAL Right     hand    SKIN CANCER EXCISION      forehead, leg nose   [3]   Family History  Problem Relation Name Age of Onset    Cervical cancer Mother      Heart disease Father      Heart disease Sister      Diabetes Sister     [4]   Allergies  Allergen Reactions    Aspirin Nausea/vomiting    Bleach (Sodium Hypochlorite) Shortness of breath     DIFFICULTY BREATHING  EYES BURN SEVERELY    Egg Yolk Anaphylaxis    Bacitracin Dermatitis     \"EATS THE SKIN AWAY\"    Neosporin (Gsv-Clb-Pzkwi) [Neomycin-Bacitracnzn-Polymyxnb] Dermatitis     \"EATS THE SKIN AWAY\"     "

## 2025-08-01 NOTE — H&P (VIEW-ONLY)
CPM/PAT Evaluation       Name: Jerilyn Chavira (Jerilyn Chavira)  /Age: 1955/69 y.o.     In-Person       Chief Complaint: Colon CA    HPI: Jerilyn Chavira is a 69 year old female who was recently diagnosed with colon cancer. She had a positive cologuard that lead to a colonoscopy. Her colonoscopy revealed a malignant appearing tumor in the ascending colon.  She denies blood in the stool, constipation, diarrhea, abdominal pain,  or unintentional weight loss. She states she has no family history of colon cancer. She is scheduled for a resection of the intestine. She denies fever, chills, chest pain, SOB, dizziness,and palpitations.     Medical History[1]    Surgical History[2]    Social History     Tobacco Use    Smoking status: Former     Current packs/day: 0.00     Average packs/day: 1 pack/day for 40.0 years (40.0 ttl pk-yrs)     Types: Cigarettes     Start date:      Quit date:      Years since quittin.5    Smokeless tobacco: Never    Tobacco comments:     Has started and stopped multiple times   Substance Use Topics    Alcohol use: Yes     Comment: SOCIALLY/RARELY     Social History     Substance and Sexual Activity   Drug Use Never         Family History[3]    Allergies[4]  Current Outpatient Medications   Medication Sig Dispense Refill    calcium carb/vit D3/minerals (CALCIUM-VITAMIN D ORAL) Take 1 tablet by mouth once daily.      chlorhexidine (Peridex) 0.12 % solution Use as directed 473 mL 0    hydroCHLOROthiazide (Microzide) 12.5 mg capsule Take 1 capsule (12.5 mg) by mouth once daily in the morning.      ibuprofen 200 mg tablet Take 1 tablet (200 mg) by mouth every 6 hours if needed for mild pain (1 - 3).      metroNIDAZOLE (Flagyl) 500 mg tablet Take 1 tablet (500 mg) by mouth 3 times a day for 3 doses. Take 1 tablet at 1:00 PM, 2:00 PM and 10:00 PM the day before surgery 3 tablet 0    mupirocin (Bactroban) 2 % ointment Apply to affected area(s) topically 3 times a day. (Patient not  taking: Reported on 8/1/2025) 22 g 1    neomycin (Mycifradin) 500 mg tablet Take two tablets by mouth at 1:00pm, 2:00pm and 10:00pm on the day prior to surgery 6 tablet 0     No current facility-administered medications for this visit.          Review of Systems   Constitutional: Negative.    HENT: Negative.     Eyes: Negative.    Respiratory: Negative.     Cardiovascular: Negative.    Gastrointestinal: Negative.    Endocrine: Negative.    Genitourinary: Negative.    Musculoskeletal: Negative.    Skin: Negative.    Allergic/Immunologic: Negative.    Neurological: Negative.    Hematological: Negative.    Psychiatric/Behavioral: Negative.             Physical Exam  Vitals reviewed.   Constitutional:       Appearance: Normal appearance.   HENT:      Head: Normocephalic and atraumatic.      Nose: Nose normal.      Mouth/Throat:      Mouth: Mucous membranes are moist.      Pharynx: Oropharynx is clear.     Eyes:      Extraocular Movements: Extraocular movements intact.      Conjunctiva/sclera: Conjunctivae normal.       Cardiovascular:      Rate and Rhythm: Normal rate and regular rhythm.      Pulses: Normal pulses.      Heart sounds: Normal heart sounds.   Pulmonary:      Effort: Pulmonary effort is normal.      Breath sounds: Normal breath sounds.   Abdominal:      Palpations: Abdomen is soft.   Genitourinary:     Comments: Assessment deferred to physicians    Musculoskeletal:         General: Normal range of motion.      Cervical back: Normal range of motion and neck supple.     Skin:     General: Skin is warm and dry.     Neurological:      General: No focal deficit present.      Mental Status: She is alert and oriented to person, place, and time.     Psychiatric:         Mood and Affect: Mood normal.         Behavior: Behavior normal.         Thought Content: Thought content normal.         Judgment: Judgment normal.          PAT AIRWAY:   Airway:     Mallampati::  II    TM distance::  >3 FB    Neck ROM::   "Full  normal            Visit Vitals  BP (!) 157/94   Pulse 91   Temp 36.6 °C (97.9 °F) (Temporal)   Resp 16   Ht 1.676 m (5' 6\")   Wt 66.9 kg (147 lb 7.8 oz)   SpO2 100%   BMI 23.81 kg/m²   OB Status Postmenopausal   Smoking Status Former   BSA 1.76 m²     ASA: II  CHADS2: 2.8%  RCRI: 0.9%  DASI:50.2  METS:8.9  STOP BAN        Assessment and Plan:     Malignant neoplasm of ascending colon : RESECTION, INTESTINE, ROBOT-ASSISTED   Hypertension: Hydrochlorothiazide    LABS: CBC, BMP, Type and Screen, MRSA    Chloe Ochoa, APRN-CNP               [1]   Past Medical History:  Diagnosis Date    Breast cyst     Colon cancer (Multi)     HTN (hypertension)     Skin cancer     Smoker    [2]   Past Surgical History:  Procedure Laterality Date    BREAST BIOPSY      BREAST CYST EXCISION Left     15 years ago    BUNIONECTOMY       SECTION, LOW TRANSVERSE      COLONOSCOPY      CYST REMOVAL Right     hand    SKIN CANCER EXCISION      forehead, leg nose   [3]   Family History  Problem Relation Name Age of Onset    Cervical cancer Mother      Heart disease Father      Heart disease Sister      Diabetes Sister     [4]   Allergies  Allergen Reactions    Aspirin Nausea/vomiting    Bleach (Sodium Hypochlorite) Shortness of breath     DIFFICULTY BREATHING  EYES BURN SEVERELY    Egg Yolk Anaphylaxis    Bacitracin Dermatitis     \"EATS THE SKIN AWAY\"    Neosporin (Vbf-Pti-Kmpng) [Neomycin-Bacitracnzn-Polymyxnb] Dermatitis     \"EATS THE SKIN AWAY\"     "

## 2025-08-01 NOTE — PREPROCEDURE INSTRUCTIONS
Medication List            Accurate as of August 1, 2025  1:16 PM. Always use your most recent med list.                CALCIUM-VITAMIN D ORAL  Additional Medication Adjustments for Surgery: Take last dose 7 days before surgery     hydroCHLOROthiazide 12.5 mg capsule  Commonly known as: Microzide  Medication Adjustments for Surgery: Take on the morning of surgery     ibuprofen 200 mg tablet  Additional Medication Adjustments for Surgery: Take last dose 7 days before surgery     metroNIDAZOLE 500 mg tablet  Commonly known as: Flagyl  Take 1 tablet (500 mg) by mouth 3 times a day for 3 doses. Take 1 tablet at 1:00 PM, 2:00 PM and 10:00 PM the day before surgery  Medication Adjustments for Surgery: Take/Use as prescribed     mupirocin 2 % ointment  Commonly known as: Bactroban  Apply to affected area(s) topically 3 times a day.  Notes to patient: NOT TAKING     neomycin 500 mg tablet  Commonly known as: Mycifradin  Take two tablets by mouth at 1:00pm, 2:00pm and 10:00pm on the day prior to surgery  Medication Adjustments for Surgery: Take/Use as prescribed                     Preoperative Fasting Guidelines    Why must I stop eating and drinking near surgery time?  With sedation, food or liquid in your stomach can enter your lungs causing serious complications  Increases nausea and vomiting    When do I need to stop eating and drinking before my surgery?  FOLLOW BOWEL PREP INSTRUCTIONS RECEIVED FROM DR. MCKEON'S OFFICE  You may have up to 13.5 ounces of clear liquid until TWO hours before your instructed arrival time to the hospital.  This includes water, black tea/coffee, (no milk or cream) apple juice, and electrolyte drinks (Gatorade)  You may chew gum until TWO hours before your surgery/procedure    PAT DISCHARGE INSTRUCTIONS    The Same Day Surgery (SDS) Department of the hospital where your procedure will be performed will contact you after 2:00 PM the day before your surgery. If you are scheduled on a Monday,  or a Tuesday following a Monday holiday, they will call on the last business day prior to your surgery.  Please check your voicemail for any missed messages.    [facility]  [address] [place] [address] [phone]  Second Floor      [facility]  [address], [address] [phone]  Second Floor  *PLEASE CALL ABOVE NUMBER FOR ARRIVAL TIME       [facility]  [address]  [place], [place] [id number] [phone]    Please let your surgeon know if:      You develop any open sores, shingles, burning or painful urination as these may increase your risk of an infection.   You no longer wish to have the surgery.   Any other personal circumstances change that may lead to the need to cancel or defer this surgery-such as being sick or getting admitted to any hospital within one week of your planned procedure.    Your contact details change, such as a change of address or phone number.    Starting now:     Please DO NOT drink alcohol or smoke for 24 hours before surgery. It is well known that quitting smoking can make a huge difference to your health and recovery from surgery. The longer you abstain from smoking, the better your chances of a healthy recovery. If you need help with quitting, call 1-800-QUIT-NOW to be connected to a trained counselor who will discuss the best methods to help you quit.     Before your surgery:    Please stop all supplements 7 days prior to surgery. Or as directed by your surgeon.   Please stop taking NSAID pain medicine such as Advil and Motrin 7 days before surgery.    If you develop any fever, cough, cold, rashes, cuts, scratches, scrapes, urinary symptoms or infection anywhere on your body (including teeth and gums) prior to surgery, please call your surgeon’s office as soon as possible. This may require treatment to reduce the chance of cancellation on the day of  surgery.    The day before your surgery:   DIET- Please follow the diet instructions at the top of your packet.   Get a good night’s rest.  Use the special soap for bathing if you have been instructed to use one.    Scheduled surgery times may change and you will be notified if this occurs - please check your personal voicemail for any updates.     On the morning of surgery:   Wear comfortable, loose fitting clothes which open in the front. Please do not wear moisturizers, creams, lotions, makeup or perfume.    Please bring with you to surgery:   Photo ID and insurance card   Current list of medicines and allergies   Pacemaker/ Defibrillator/Heart stent cards   CPAP machine and mask    Slings/ splints/ crutches   A copy of your complete advanced directive/DHPOA.    Please do NOT bring with you to surgery:   All jewelry and valuables should be left at home.   Prosthetic devices such as contact lenses, hearing aids, dentures, eyelash extensions, hairpins and body piercings must be removed prior to going in to the surgical suite.    After outpatient surgery:   A responsible adult MUST accompany you at the time of discharge and stay with you for 24 hours after your surgery. You may NOT drive yourself home after surgery.    Do not drive, operate machinery, make critical decisions or do activities that require co-ordination or balance until after a night’s sleep.   Do not drink alcoholic beverages for 24 hours.   Instructions for resuming your medications will be provided by your surgeon.    CALL YOUR DOCTOR AFTER SURGERY IF YOU HAVE:     Chills and/or a fever of 101° F or higher.    Redness, swelling, pus or drainage from your surgical wound or a bad smell from the wound.    Lightheadedness, fainting or confusion.    Persistent vomiting (throwing up) and are not able to eat or drink for 12 hours.    Three or more loose, watery bowel movements in 24 hours (diarrhea).   Difficulty or pain while urinating( after  non-urological surgery)    Pain and swelling in your legs, especially if it is only on one side.    Difficulty breathing or are breathing faster than normal.    Any new concerning symptoms        Patient Information: Pre-Operative Infection Prevention Measures     Why did I have my nose, under my arms, and groin swabbed?  The purpose of the swab is to identify Staphylococcus aureus inside your nose or on your skin.  The swab was sent to the laboratory for culture.  A positive swab/culture for Staphylococcus aureus is called colonization or carriage.      What is Staphylococcus aureus?  Staphylococcus aureus, also known as “staph”, is a germ found on the skin or in the nose of healthy people.  Sometimes Staphylococcus aureus can get into the body and cause an infection.  This can be minor (such as pimples, boils, or other skin problems).  It might also be serious (such as a blood infection, pneumonia, or a surgical site infection).    What is Staphylococcus aureus colonization or carriage?  Colonization or carriage means that a person has the germ but is not sick from it.  These bacteria can be spread on the hands or when breathing or sneezing.    How is Staphylococcus aureus spread?  It is most often spread by close contact with a person or item that carries it.    What happens if my culture is positive for Staphylococcus aureus?  Your doctor/medical team will use this information to guide any antibiotic treatment which may be necessary.  Regardless of the culture results, we will clean the inside of your nose with a betadine swab just before you have your surgery.      Will I get an infection if I have Staphylococcus aureus in my nose or on my skin?  Anyone can get an infection with Staphylococcus aureus.  However, the best way to reduce your risk of infection is to follow the instructions provided to you for the use of your CHG soap and dental rinse.        Patient Information: Oral/Dental Rinse    What is  oral/dental rinse?   It is a mouthwash. It is a way of cleaning the mouth with a germ-killing solution before your surgery.  The solution contains chlorhexidine, commonly known as CHG.   It is used inside the mouth to kill a bacteria known as Staphylococcus aureus.  Let your doctor know if you are allergic to Chlorhexidine.    Why do I need to use CHG oral/dental rinse?  The CHG oral/dental rinse helps to kill a bacteria in your mouth known as Staphylococcus aureus.     This reduces the risk of infection at the surgical site.      Using your CHG oral/dental rinse  STEPS:  Use your CHG oral/dental rinse after you brush your teeth the night before (at bedtime) and the morning of your surgery.  Follow all directions on your prescription label.    Use the cap on the container to measure 15ml   Swish (gargle if you can) the mouthwash in your mouth for at least 30 seconds, (do not swallow) and spit out  After you use your CHG rinse, do not rinse your mouth with water, drink or eat.  Please refer to the prescription label for the appropriate time to resume oral intake      What side effects might I have using the CHG oral/dental rinse?  CHG rinse will stick to plaque on the teeth.  Brush and floss just before use.  Teeth brushing will help avoid staining of plaque during use.      Patient Information: Home Preoperative Antibacterial Shower      What is a home preoperative antibacterial shower?  This shower is a way of cleaning the skin with a germ-killing solution before surgery.  The solution contains chlorhexidine, commonly known as CHG.  CHG is a skin cleanser with germ-killing ability.  Let your doctor know if you are allergic to chlorhexidine.    Why do I need to take a preoperative antibacterial shower?  Skin is not sterile.  It is best to try to make your skin as free of germs as possible before surgery.  Proper cleansing with a germ-killing soap before surgery can lower the number of germs on your skin.  This helps  to reduce the risk of infection at the surgical site.  Following the instructions listed below will help you prepare your skin for surgery.      How do I use the solution?  Steps:  Begin using your CHG soap 5 days before your scheduled surgery on _____8/8/25 -- START WASH 8/4/25___.    First, wash and rinse your hair using the CHG soap. Keep CHG soap away from ear canals and eyes.  Rinse completely, do not condition.  Hair extensions should be removed.  Wash your face with your normal soap and rinse.    Apply the CHG solution to a clean wet washcloth.  Turn the water off or move away from the water spray to avoid premature rinsing of the CHG soap as you are applying.   Firmly lather your entire body from the neck down.  Do not use on your face.  Pay special attention to the area(s) where your incision(s) will be located unless they are on your face.  Avoid scrubbing your skin too hard.  The important point is to have the CHG soap sit on your skin for 3 minutes.    When the 3 minutes are up, turn on the water and rinse the CHG solution off your body completely.   DO NOT wash with regular soap after you have used the CHG soap solution  Pat yourself dry with a clean, freshly-laundered towel.  DO NOT apply powders, deodorants, or lotions.  Dress in clean, freshly laundered nightclothes.    Be sure to sleep with clean, freshly laundered sheets.  Be aware that CHG will cause stains on fabrics; if you wash them with bleach after use.  Rinse your washcloth and other linens that have contact with CHG completely.  Use only non-chlorine detergents to launder the items used.   The morning of surgery is the fifth day.  Repeat the above steps and dress in clean comfortable clothing     Whom should I contact if I have any questions regarding the use of CHG soap?  Call the [facility] at [phone] if you have any questions.

## 2025-08-03 ENCOUNTER — HOSPITAL ENCOUNTER (OUTPATIENT)
Dept: RADIOLOGY | Facility: HOSPITAL | Age: 70
Discharge: HOME | End: 2025-08-03
Payer: MEDICARE

## 2025-08-03 DIAGNOSIS — M89.9 BONE LESION: ICD-10-CM

## 2025-08-03 DIAGNOSIS — C18.2 MALIGNANT NEOPLASM OF ASCENDING COLON (MULTI): ICD-10-CM

## 2025-08-03 LAB — STAPHYLOCOCCUS SPEC CULT: NORMAL

## 2025-08-03 PROCEDURE — 2550000001 HC RX 255 CONTRASTS: Performed by: STUDENT IN AN ORGANIZED HEALTH CARE EDUCATION/TRAINING PROGRAM

## 2025-08-03 PROCEDURE — 72157 MRI CHEST SPINE W/O & W/DYE: CPT | Performed by: RADIOLOGY

## 2025-08-03 PROCEDURE — A9575 INJ GADOTERATE MEGLUMI 0.1ML: HCPCS | Performed by: STUDENT IN AN ORGANIZED HEALTH CARE EDUCATION/TRAINING PROGRAM

## 2025-08-03 PROCEDURE — 72157 MRI CHEST SPINE W/O & W/DYE: CPT

## 2025-08-03 RX ORDER — GADOTERATE MEGLUMINE 376.9 MG/ML
14 INJECTION INTRAVENOUS
Status: COMPLETED | OUTPATIENT
Start: 2025-08-03 | End: 2025-08-03

## 2025-08-03 RX ADMIN — GADOTERATE MEGLUMINE 14 ML: 376.9 INJECTION INTRAVENOUS at 11:39

## 2025-08-07 ENCOUNTER — ANESTHESIA EVENT (OUTPATIENT)
Dept: OPERATING ROOM | Facility: HOSPITAL | Age: 70
End: 2025-08-07
Payer: MEDICARE

## 2025-08-08 ENCOUNTER — ANESTHESIA (OUTPATIENT)
Dept: OPERATING ROOM | Facility: HOSPITAL | Age: 70
End: 2025-08-08
Payer: MEDICARE

## 2025-08-08 ENCOUNTER — HOSPITAL ENCOUNTER (INPATIENT)
Facility: HOSPITAL | Age: 70
End: 2025-08-08
Attending: STUDENT IN AN ORGANIZED HEALTH CARE EDUCATION/TRAINING PROGRAM | Admitting: STUDENT IN AN ORGANIZED HEALTH CARE EDUCATION/TRAINING PROGRAM
Payer: MEDICARE

## 2025-08-08 DIAGNOSIS — C18.9 MALIGNANT NEOPLASM OF COLON, UNSPECIFIED PART OF COLON (MULTI): Primary | ICD-10-CM

## 2025-08-08 DIAGNOSIS — C18.2 MALIGNANT NEOPLASM OF ASCENDING COLON (MULTI): ICD-10-CM

## 2025-08-08 LAB
ABO GROUP (TYPE) IN BLOOD: NORMAL
RH FACTOR (ANTIGEN D): NORMAL

## 2025-08-08 PROCEDURE — 88309 TISSUE EXAM BY PATHOLOGIST: CPT | Performed by: PATHOLOGY

## 2025-08-08 PROCEDURE — 2720000007 HC OR 272 NO HCPCS: Performed by: STUDENT IN AN ORGANIZED HEALTH CARE EDUCATION/TRAINING PROGRAM

## 2025-08-08 PROCEDURE — 2500000002 HC RX 250 W HCPCS SELF ADMINISTERED DRUGS (ALT 637 FOR MEDICARE OP, ALT 636 FOR OP/ED): Performed by: STUDENT IN AN ORGANIZED HEALTH CARE EDUCATION/TRAINING PROGRAM

## 2025-08-08 PROCEDURE — 3700000001 HC GENERAL ANESTHESIA TIME - INITIAL BASE CHARGE: Performed by: STUDENT IN AN ORGANIZED HEALTH CARE EDUCATION/TRAINING PROGRAM

## 2025-08-08 PROCEDURE — 88341 IMHCHEM/IMCYTCHM EA ADD ANTB: CPT | Performed by: PATHOLOGY

## 2025-08-08 PROCEDURE — 2500000004 HC RX 250 GENERAL PHARMACY W/ HCPCS (ALT 636 FOR OP/ED): Performed by: NURSE ANESTHETIST, CERTIFIED REGISTERED

## 2025-08-08 PROCEDURE — 88309 TISSUE EXAM BY PATHOLOGIST: CPT | Mod: TC,WESLAB | Performed by: STUDENT IN AN ORGANIZED HEALTH CARE EDUCATION/TRAINING PROGRAM

## 2025-08-08 PROCEDURE — 88342 IMHCHEM/IMCYTCHM 1ST ANTB: CPT | Performed by: PATHOLOGY

## 2025-08-08 PROCEDURE — 2500000004 HC RX 250 GENERAL PHARMACY W/ HCPCS (ALT 636 FOR OP/ED): Mod: JZ | Performed by: PHYSICIAN ASSISTANT

## 2025-08-08 PROCEDURE — 7100000001 HC RECOVERY ROOM TIME - INITIAL BASE CHARGE: Performed by: STUDENT IN AN ORGANIZED HEALTH CARE EDUCATION/TRAINING PROGRAM

## 2025-08-08 PROCEDURE — 0DTF4ZZ RESECTION OF RIGHT LARGE INTESTINE, PERCUTANEOUS ENDOSCOPIC APPROACH: ICD-10-PCS | Performed by: STUDENT IN AN ORGANIZED HEALTH CARE EDUCATION/TRAINING PROGRAM

## 2025-08-08 PROCEDURE — 2500000005 HC RX 250 GENERAL PHARMACY W/O HCPCS: Performed by: NURSE ANESTHETIST, CERTIFIED REGISTERED

## 2025-08-08 PROCEDURE — 44205 LAP COLECTOMY PART W/ILEUM: CPT | Performed by: STUDENT IN AN ORGANIZED HEALTH CARE EDUCATION/TRAINING PROGRAM

## 2025-08-08 PROCEDURE — 2500000001 HC RX 250 WO HCPCS SELF ADMINISTERED DRUGS (ALT 637 FOR MEDICARE OP): Performed by: STUDENT IN AN ORGANIZED HEALTH CARE EDUCATION/TRAINING PROGRAM

## 2025-08-08 PROCEDURE — 36415 COLL VENOUS BLD VENIPUNCTURE: CPT | Performed by: ANESTHESIOLOGY

## 2025-08-08 PROCEDURE — 3600000017 HC OR TIME - EACH INCREMENTAL 1 MINUTE - PROCEDURE LEVEL SIX: Performed by: STUDENT IN AN ORGANIZED HEALTH CARE EDUCATION/TRAINING PROGRAM

## 2025-08-08 PROCEDURE — 1100000001 HC PRIVATE ROOM DAILY

## 2025-08-08 PROCEDURE — 7100000002 HC RECOVERY ROOM TIME - EACH INCREMENTAL 1 MINUTE: Performed by: STUDENT IN AN ORGANIZED HEALTH CARE EDUCATION/TRAINING PROGRAM

## 2025-08-08 PROCEDURE — 2780000003 HC OR 278 NO HCPCS: Performed by: STUDENT IN AN ORGANIZED HEALTH CARE EDUCATION/TRAINING PROGRAM

## 2025-08-08 PROCEDURE — 3700000002 HC GENERAL ANESTHESIA TIME - EACH INCREMENTAL 1 MINUTE: Performed by: STUDENT IN AN ORGANIZED HEALTH CARE EDUCATION/TRAINING PROGRAM

## 2025-08-08 PROCEDURE — 2500000004 HC RX 250 GENERAL PHARMACY W/ HCPCS (ALT 636 FOR OP/ED): Performed by: STUDENT IN AN ORGANIZED HEALTH CARE EDUCATION/TRAINING PROGRAM

## 2025-08-08 PROCEDURE — 8E0W4CZ ROBOTIC ASSISTED PROCEDURE OF TRUNK REGION, PERCUTANEOUS ENDOSCOPIC APPROACH: ICD-10-PCS | Performed by: STUDENT IN AN ORGANIZED HEALTH CARE EDUCATION/TRAINING PROGRAM

## 2025-08-08 PROCEDURE — 2500000004 HC RX 250 GENERAL PHARMACY W/ HCPCS (ALT 636 FOR OP/ED): Performed by: ANESTHESIOLOGY

## 2025-08-08 PROCEDURE — S2900 ROBOTIC SURGICAL SYSTEM: HCPCS | Performed by: STUDENT IN AN ORGANIZED HEALTH CARE EDUCATION/TRAINING PROGRAM

## 2025-08-08 PROCEDURE — 2500000005 HC RX 250 GENERAL PHARMACY W/O HCPCS: Performed by: STUDENT IN AN ORGANIZED HEALTH CARE EDUCATION/TRAINING PROGRAM

## 2025-08-08 PROCEDURE — 3600000018 HC OR TIME - INITIAL BASE CHARGE - PROCEDURE LEVEL SIX: Performed by: STUDENT IN AN ORGANIZED HEALTH CARE EDUCATION/TRAINING PROGRAM

## 2025-08-08 PROCEDURE — RXMED WILLOW AMBULATORY MEDICATION CHARGE

## 2025-08-08 RX ORDER — FENTANYL CITRATE 50 UG/ML
50 INJECTION, SOLUTION INTRAMUSCULAR; INTRAVENOUS EVERY 5 MIN PRN
Status: DISCONTINUED | OUTPATIENT
Start: 2025-08-08 | End: 2025-08-08 | Stop reason: HOSPADM

## 2025-08-08 RX ORDER — ROCURONIUM BROMIDE 10 MG/ML
INJECTION, SOLUTION INTRAVENOUS AS NEEDED
Status: DISCONTINUED | OUTPATIENT
Start: 2025-08-08 | End: 2025-08-08

## 2025-08-08 RX ORDER — LABETALOL HYDROCHLORIDE 5 MG/ML
INJECTION, SOLUTION INTRAVENOUS AS NEEDED
Status: DISCONTINUED | OUTPATIENT
Start: 2025-08-08 | End: 2025-08-08

## 2025-08-08 RX ORDER — HEPARIN SODIUM 5000 [USP'U]/ML
5000 INJECTION, SOLUTION INTRAVENOUS; SUBCUTANEOUS ONCE
Status: COMPLETED | OUTPATIENT
Start: 2025-08-08 | End: 2025-08-08

## 2025-08-08 RX ORDER — TIZANIDINE 2 MG/1
2 TABLET ORAL EVERY 6 HOURS
Status: DISPENSED | OUTPATIENT
Start: 2025-08-08

## 2025-08-08 RX ORDER — ENOXAPARIN SODIUM 100 MG/ML
40 INJECTION SUBCUTANEOUS DAILY
Qty: 30 EACH | Refills: 0 | Status: SHIPPED | OUTPATIENT
Start: 2025-08-08

## 2025-08-08 RX ORDER — CEFAZOLIN SODIUM 2 G/50ML
2 SOLUTION INTRAVENOUS ONCE
Status: COMPLETED | OUTPATIENT
Start: 2025-08-08 | End: 2025-08-08

## 2025-08-08 RX ORDER — ENOXAPARIN SODIUM 100 MG/ML
40 INJECTION SUBCUTANEOUS DAILY
Status: DISPENSED | OUTPATIENT
Start: 2025-08-09

## 2025-08-08 RX ORDER — ONDANSETRON HYDROCHLORIDE 2 MG/ML
4 INJECTION, SOLUTION INTRAVENOUS ONCE AS NEEDED
Status: DISCONTINUED | OUTPATIENT
Start: 2025-08-08 | End: 2025-08-08 | Stop reason: HOSPADM

## 2025-08-08 RX ORDER — FENTANYL CITRATE 50 UG/ML
INJECTION, SOLUTION INTRAMUSCULAR; INTRAVENOUS AS NEEDED
Status: DISCONTINUED | OUTPATIENT
Start: 2025-08-08 | End: 2025-08-08

## 2025-08-08 RX ORDER — FENTANYL CITRATE 50 UG/ML
25 INJECTION, SOLUTION INTRAMUSCULAR; INTRAVENOUS EVERY 5 MIN PRN
Status: DISCONTINUED | OUTPATIENT
Start: 2025-08-08 | End: 2025-08-08 | Stop reason: HOSPADM

## 2025-08-08 RX ORDER — PROCHLORPERAZINE 25 MG/1
25 SUPPOSITORY RECTAL EVERY 12 HOURS PRN
Status: ACTIVE | OUTPATIENT
Start: 2025-08-08

## 2025-08-08 RX ORDER — ACETAMINOPHEN 325 MG/1
650 TABLET ORAL EVERY 6 HOURS
Status: DISPENSED | OUTPATIENT
Start: 2025-08-08

## 2025-08-08 RX ORDER — LIDOCAINE HYDROCHLORIDE AND EPINEPHRINE 10; 10 UG/ML; MG/ML
INJECTION, SOLUTION INFILTRATION; PERINEURAL AS NEEDED
Status: DISCONTINUED | OUTPATIENT
Start: 2025-08-08 | End: 2025-08-08 | Stop reason: HOSPADM

## 2025-08-08 RX ORDER — METRONIDAZOLE 500 MG/100ML
500 INJECTION, SOLUTION INTRAVENOUS ONCE
Status: COMPLETED | OUTPATIENT
Start: 2025-08-08 | End: 2025-08-08

## 2025-08-08 RX ORDER — LIDOCAINE HYDROCHLORIDE 20 MG/ML
INJECTION, SOLUTION INFILTRATION; PERINEURAL AS NEEDED
Status: DISCONTINUED | OUTPATIENT
Start: 2025-08-08 | End: 2025-08-08

## 2025-08-08 RX ORDER — LIDOCAINE 560 MG/1
1 PATCH PERCUTANEOUS; TOPICAL; TRANSDERMAL DAILY PRN
Status: ACTIVE | OUTPATIENT
Start: 2025-08-08

## 2025-08-08 RX ORDER — LABETALOL HYDROCHLORIDE 5 MG/ML
5 INJECTION, SOLUTION INTRAVENOUS ONCE AS NEEDED
Status: DISCONTINUED | OUTPATIENT
Start: 2025-08-08 | End: 2025-08-08 | Stop reason: HOSPADM

## 2025-08-08 RX ORDER — INDOCYANINE GREEN AND WATER 25 MG
KIT INJECTION AS NEEDED
Status: DISCONTINUED | OUTPATIENT
Start: 2025-08-08 | End: 2025-08-08

## 2025-08-08 RX ORDER — TIZANIDINE 4 MG/1
4 TABLET ORAL EVERY 6 HOURS PRN
Qty: 30 TABLET | Refills: 0 | Status: SHIPPED | OUTPATIENT
Start: 2025-08-08

## 2025-08-08 RX ORDER — NALOXONE HYDROCHLORIDE 0.4 MG/ML
0.2 INJECTION, SOLUTION INTRAMUSCULAR; INTRAVENOUS; SUBCUTANEOUS EVERY 5 MIN PRN
Status: ACTIVE | OUTPATIENT
Start: 2025-08-08

## 2025-08-08 RX ORDER — TRAMADOL HYDROCHLORIDE 50 MG/1
50 TABLET, FILM COATED ORAL EVERY 6 HOURS PRN
Status: DISPENSED | OUTPATIENT
Start: 2025-08-08

## 2025-08-08 RX ORDER — ONDANSETRON 4 MG/1
4 TABLET, FILM COATED ORAL EVERY 8 HOURS PRN
Status: ACTIVE | OUTPATIENT
Start: 2025-08-08

## 2025-08-08 RX ORDER — MIDAZOLAM HYDROCHLORIDE 1 MG/ML
INJECTION, SOLUTION INTRAMUSCULAR; INTRAVENOUS AS NEEDED
Status: DISCONTINUED | OUTPATIENT
Start: 2025-08-08 | End: 2025-08-08

## 2025-08-08 RX ORDER — SODIUM CHLORIDE, SODIUM LACTATE, POTASSIUM CHLORIDE, CALCIUM CHLORIDE 600; 310; 30; 20 MG/100ML; MG/100ML; MG/100ML; MG/100ML
INJECTION, SOLUTION INTRAVENOUS CONTINUOUS PRN
Status: DISCONTINUED | OUTPATIENT
Start: 2025-08-08 | End: 2025-08-08

## 2025-08-08 RX ORDER — LIDOCAINE HYDROCHLORIDE 10 MG/ML
0.1 INJECTION, SOLUTION INFILTRATION; PERINEURAL ONCE
Status: DISCONTINUED | OUTPATIENT
Start: 2025-08-08 | End: 2025-08-08 | Stop reason: HOSPADM

## 2025-08-08 RX ORDER — ACETAMINOPHEN 500 MG
1000 TABLET ORAL ONCE
Status: COMPLETED | OUTPATIENT
Start: 2025-08-08 | End: 2025-08-08

## 2025-08-08 RX ORDER — OXYCODONE HYDROCHLORIDE 5 MG/1
5 TABLET ORAL EVERY 4 HOURS PRN
Status: DISCONTINUED | OUTPATIENT
Start: 2025-08-08 | End: 2025-08-08 | Stop reason: HOSPADM

## 2025-08-08 RX ORDER — DEXMEDETOMIDINE IN 0.9 % NACL 20 MCG/5ML
SYRINGE (ML) INTRAVENOUS AS NEEDED
Status: DISCONTINUED | OUTPATIENT
Start: 2025-08-08 | End: 2025-08-08

## 2025-08-08 RX ORDER — HYDROMORPHONE HYDROCHLORIDE 0.2 MG/ML
0.2 INJECTION INTRAMUSCULAR; INTRAVENOUS; SUBCUTANEOUS
Status: DISPENSED | OUTPATIENT
Start: 2025-08-08

## 2025-08-08 RX ORDER — PROCHLORPERAZINE MALEATE 10 MG
10 TABLET ORAL EVERY 6 HOURS PRN
Status: ACTIVE | OUTPATIENT
Start: 2025-08-08

## 2025-08-08 RX ORDER — PROCHLORPERAZINE EDISYLATE 5 MG/ML
10 INJECTION INTRAMUSCULAR; INTRAVENOUS EVERY 6 HOURS PRN
Status: ACTIVE | OUTPATIENT
Start: 2025-08-08

## 2025-08-08 RX ORDER — PROPOFOL 10 MG/ML
INJECTION, EMULSION INTRAVENOUS AS NEEDED
Status: DISCONTINUED | OUTPATIENT
Start: 2025-08-08 | End: 2025-08-08

## 2025-08-08 RX ORDER — ACETAMINOPHEN 325 MG/1
650 TABLET ORAL EVERY 6 HOURS PRN
Qty: 30 TABLET | Refills: 0 | Status: SHIPPED | OUTPATIENT
Start: 2025-08-08

## 2025-08-08 RX ORDER — ONDANSETRON HYDROCHLORIDE 2 MG/ML
4 INJECTION, SOLUTION INTRAVENOUS EVERY 4 HOURS PRN
Status: DISPENSED | OUTPATIENT
Start: 2025-08-08

## 2025-08-08 RX ORDER — MEPERIDINE HYDROCHLORIDE 25 MG/ML
12.5 INJECTION INTRAMUSCULAR; INTRAVENOUS; SUBCUTANEOUS EVERY 10 MIN PRN
Status: DISCONTINUED | OUTPATIENT
Start: 2025-08-08 | End: 2025-08-08 | Stop reason: HOSPADM

## 2025-08-08 RX ADMIN — ACETAMINOPHEN 1000 MG: 500 TABLET ORAL at 06:34

## 2025-08-08 RX ADMIN — HEPARIN SODIUM 5000 UNITS: 5000 INJECTION, SOLUTION INTRAVENOUS; SUBCUTANEOUS at 06:35

## 2025-08-08 RX ADMIN — ROCURONIUM BROMIDE 50 MG: 10 INJECTION, SOLUTION INTRAVENOUS at 07:33

## 2025-08-08 RX ADMIN — INDOCYANINE GREEN AND WATER 3 ML: KIT at 09:54

## 2025-08-08 RX ADMIN — ACETAMINOPHEN 650 MG: 325 TABLET ORAL at 14:01

## 2025-08-08 RX ADMIN — LABETALOL HYDROCHLORIDE 5 MG: 5 INJECTION INTRAVENOUS at 08:11

## 2025-08-08 RX ADMIN — INDOCYANINE GREEN AND WATER 3 ML: KIT at 09:40

## 2025-08-08 RX ADMIN — ACETAMINOPHEN 650 MG: 325 TABLET ORAL at 19:38

## 2025-08-08 RX ADMIN — ROCURONIUM BROMIDE 20 MG: 10 INJECTION, SOLUTION INTRAVENOUS at 08:34

## 2025-08-08 RX ADMIN — SODIUM CHLORIDE, POTASSIUM CHLORIDE, SODIUM LACTATE AND CALCIUM CHLORIDE: 600; 310; 30; 20 INJECTION, SOLUTION INTRAVENOUS at 10:00

## 2025-08-08 RX ADMIN — FENTANYL CITRATE 50 MCG: 50 INJECTION INTRAMUSCULAR; INTRAVENOUS at 12:23

## 2025-08-08 RX ADMIN — FENTANYL CITRATE 50 MCG: 50 INJECTION, SOLUTION INTRAMUSCULAR; INTRAVENOUS at 07:32

## 2025-08-08 RX ADMIN — ROCURONIUM BROMIDE 20 MG: 10 INJECTION, SOLUTION INTRAVENOUS at 09:58

## 2025-08-08 RX ADMIN — FENTANYL CITRATE 50 MCG: 50 INJECTION, SOLUTION INTRAMUSCULAR; INTRAVENOUS at 07:48

## 2025-08-08 RX ADMIN — INDOCYANINE GREEN AND WATER 4 ML: KIT at 10:28

## 2025-08-08 RX ADMIN — TIZANIDINE 2 MG: 2 TABLET ORAL at 19:50

## 2025-08-08 RX ADMIN — TRAMADOL HYDROCHLORIDE 50 MG: 50 TABLET, COATED ORAL at 14:01

## 2025-08-08 RX ADMIN — METRONIDAZOLE 500 MG: 500 INJECTION, SOLUTION INTRAVENOUS at 07:40

## 2025-08-08 RX ADMIN — Medication 12 MCG: at 11:03

## 2025-08-08 RX ADMIN — Medication 1 DOSE: at 13:00

## 2025-08-08 RX ADMIN — TIZANIDINE 2 MG: 2 TABLET ORAL at 14:01

## 2025-08-08 RX ADMIN — FENTANYL CITRATE 50 MCG: 50 INJECTION, SOLUTION INTRAMUSCULAR; INTRAVENOUS at 07:58

## 2025-08-08 RX ADMIN — FENTANYL CITRATE 50 MCG: 50 INJECTION, SOLUTION INTRAMUSCULAR; INTRAVENOUS at 10:40

## 2025-08-08 RX ADMIN — LABETALOL HYDROCHLORIDE 5 MG: 5 INJECTION INTRAVENOUS at 08:08

## 2025-08-08 RX ADMIN — HYDROMORPHONE HYDROCHLORIDE 0.2 MG: 0.2 INJECTION, SOLUTION INTRAMUSCULAR; INTRAVENOUS; SUBCUTANEOUS at 15:02

## 2025-08-08 RX ADMIN — DEXAMETHASONE SODIUM PHOSPHATE 8 MG: 4 INJECTION, SOLUTION INTRAMUSCULAR; INTRAVENOUS at 07:51

## 2025-08-08 RX ADMIN — SODIUM CHLORIDE, POTASSIUM CHLORIDE, SODIUM LACTATE AND CALCIUM CHLORIDE: 600; 310; 30; 20 INJECTION, SOLUTION INTRAVENOUS at 07:29

## 2025-08-08 RX ADMIN — MIDAZOLAM 2 MG: 1 INJECTION INTRAMUSCULAR; INTRAVENOUS at 07:29

## 2025-08-08 RX ADMIN — SUGAMMADEX 200 MG: 100 INJECTION, SOLUTION INTRAVENOUS at 10:52

## 2025-08-08 RX ADMIN — LIDOCAINE HYDROCHLORIDE 100 MG: 20 INJECTION, SOLUTION INFILTRATION; PERINEURAL at 07:33

## 2025-08-08 RX ADMIN — CEFAZOLIN SODIUM 2 G: 2 SOLUTION INTRAVENOUS at 07:29

## 2025-08-08 RX ADMIN — PROPOFOL 200 MG: 10 INJECTION, EMULSION INTRAVENOUS at 07:33

## 2025-08-08 SDOH — SOCIAL STABILITY: SOCIAL NETWORK: HOW OFTEN DO YOU ATTEND CHURCH OR RELIGIOUS SERVICES?: PATIENT DECLINED

## 2025-08-08 SDOH — SOCIAL STABILITY: SOCIAL INSECURITY: ARE YOU MARRIED, WIDOWED, DIVORCED, SEPARATED, NEVER MARRIED, OR LIVING WITH A PARTNER?: DIVORCED

## 2025-08-08 SDOH — ECONOMIC STABILITY: FOOD INSECURITY: WITHIN THE PAST 12 MONTHS, THE FOOD YOU BOUGHT JUST DIDN'T LAST AND YOU DIDN'T HAVE MONEY TO GET MORE.: NEVER TRUE

## 2025-08-08 SDOH — ECONOMIC STABILITY: HOUSING INSECURITY: DO YOU FEEL UNSAFE GOING BACK TO THE PLACE WHERE YOU LIVE?: NO

## 2025-08-08 SDOH — SOCIAL STABILITY: SOCIAL INSECURITY: DO YOU FEEL ANYONE HAS EXPLOITED OR TAKEN ADVANTAGE OF YOU FINANCIALLY OR OF YOUR PERSONAL PROPERTY?: NO

## 2025-08-08 SDOH — SOCIAL STABILITY: SOCIAL INSECURITY: WITHIN THE LAST YEAR, HAVE YOU BEEN AFRAID OF YOUR PARTNER OR EX-PARTNER?: NO

## 2025-08-08 SDOH — SOCIAL STABILITY: SOCIAL INSECURITY
WITHIN THE LAST YEAR, HAVE YOU BEEN RAPED OR FORCED TO HAVE ANY KIND OF SEXUAL ACTIVITY BY YOUR PARTNER OR EX-PARTNER?: NO

## 2025-08-08 SDOH — SOCIAL STABILITY: SOCIAL INSECURITY: ARE YOU OR HAVE YOU BEEN THREATENED OR ABUSED PHYSICALLY, EMOTIONALLY, OR SEXUALLY BY ANYONE?: NO

## 2025-08-08 SDOH — ECONOMIC STABILITY: FOOD INSECURITY: WITHIN THE PAST 12 MONTHS, YOU WORRIED THAT YOUR FOOD WOULD RUN OUT BEFORE YOU GOT THE MONEY TO BUY MORE.: NEVER TRUE

## 2025-08-08 SDOH — HEALTH STABILITY: MENTAL HEALTH: HOW OFTEN DO YOU HAVE SIX OR MORE DRINKS ON ONE OCCASION?: NEVER

## 2025-08-08 SDOH — SOCIAL STABILITY: SOCIAL INSECURITY: ARE THERE ANY APPARENT SIGNS OF INJURIES/BEHAVIORS THAT COULD BE RELATED TO ABUSE/NEGLECT?: NO

## 2025-08-08 SDOH — HEALTH STABILITY: MENTAL HEALTH
DO YOU FEEL STRESS - TENSE, RESTLESS, NERVOUS, OR ANXIOUS, OR UNABLE TO SLEEP AT NIGHT BECAUSE YOUR MIND IS TROUBLED ALL THE TIME - THESE DAYS?: TO SOME EXTENT

## 2025-08-08 SDOH — SOCIAL STABILITY: SOCIAL INSECURITY: WITHIN THE LAST YEAR, HAVE YOU BEEN HUMILIATED OR EMOTIONALLY ABUSED IN OTHER WAYS BY YOUR PARTNER OR EX-PARTNER?: NO

## 2025-08-08 SDOH — HEALTH STABILITY: MENTAL HEALTH: HOW OFTEN DO YOU HAVE A DRINK CONTAINING ALCOHOL?: MONTHLY OR LESS

## 2025-08-08 SDOH — HEALTH STABILITY: PHYSICAL HEALTH: ON AVERAGE, HOW MANY MINUTES DO YOU ENGAGE IN EXERCISE AT THIS LEVEL?: 10 MIN

## 2025-08-08 SDOH — HEALTH STABILITY: MENTAL HEALTH: HOW MANY DRINKS CONTAINING ALCOHOL DO YOU HAVE ON A TYPICAL DAY WHEN YOU ARE DRINKING?: 1 OR 2

## 2025-08-08 SDOH — SOCIAL STABILITY: SOCIAL INSECURITY
WITHIN THE LAST YEAR, HAVE YOU BEEN KICKED, HIT, SLAPPED, OR OTHERWISE PHYSICALLY HURT BY YOUR PARTNER OR EX-PARTNER?: NO

## 2025-08-08 SDOH — SOCIAL STABILITY: SOCIAL NETWORK
IN A TYPICAL WEEK, HOW MANY TIMES DO YOU TALK ON THE PHONE WITH FAMILY, FRIENDS, OR NEIGHBORS?: MORE THAN THREE TIMES A WEEK

## 2025-08-08 SDOH — ECONOMIC STABILITY: INCOME INSECURITY: IN THE PAST 12 MONTHS HAS THE ELECTRIC, GAS, OIL, OR WATER COMPANY THREATENED TO SHUT OFF SERVICES IN YOUR HOME?: NO

## 2025-08-08 SDOH — SOCIAL STABILITY: SOCIAL INSECURITY: ABUSE: ADULT

## 2025-08-08 SDOH — SOCIAL STABILITY: SOCIAL NETWORK
DO YOU BELONG TO ANY CLUBS OR ORGANIZATIONS SUCH AS CHURCH GROUPS, UNIONS, FRATERNAL OR ATHLETIC GROUPS, OR SCHOOL GROUPS?: PATIENT DECLINED

## 2025-08-08 SDOH — SOCIAL STABILITY: SOCIAL NETWORK: HOW OFTEN DO YOU GET TOGETHER WITH FRIENDS OR RELATIVES?: MORE THAN THREE TIMES A WEEK

## 2025-08-08 SDOH — HEALTH STABILITY: PHYSICAL HEALTH
HOW OFTEN DO YOU NEED TO HAVE SOMEONE HELP YOU WHEN YOU READ INSTRUCTIONS, PAMPHLETS, OR OTHER WRITTEN MATERIAL FROM YOUR DOCTOR OR PHARMACY?: RARELY

## 2025-08-08 SDOH — SOCIAL STABILITY: SOCIAL INSECURITY
ASK PARENT OR GUARDIAN: ARE THERE TIMES WHEN YOU, YOUR CHILD(REN), OR ANY MEMBER OF YOUR HOUSEHOLD FEEL UNSAFE, HARMED, OR THREATENED AROUND PERSONS WITH WHOM YOU KNOW OR LIVE?: NO

## 2025-08-08 SDOH — SOCIAL STABILITY: SOCIAL NETWORK: HOW OFTEN DO YOU ATTEND MEETINGS OF THE CLUBS OR ORGANIZATIONS YOU BELONG TO?: PATIENT DECLINED

## 2025-08-08 SDOH — SOCIAL STABILITY: SOCIAL INSECURITY: HAVE YOU HAD ANY THOUGHTS OF HARMING ANYONE ELSE?: NO

## 2025-08-08 SDOH — HEALTH STABILITY: PHYSICAL HEALTH: ON AVERAGE, HOW MANY DAYS PER WEEK DO YOU ENGAGE IN MODERATE TO STRENUOUS EXERCISE (LIKE A BRISK WALK)?: 7 DAYS

## 2025-08-08 SDOH — SOCIAL STABILITY: SOCIAL INSECURITY: WERE YOU ABLE TO COMPLETE ALL THE BEHAVIORAL HEALTH SCREENINGS?: YES

## 2025-08-08 SDOH — SOCIAL STABILITY: SOCIAL INSECURITY: DO YOU FEEL UNSAFE GOING BACK TO THE PLACE WHERE YOU ARE LIVING?: NO

## 2025-08-08 SDOH — SOCIAL STABILITY: SOCIAL INSECURITY: HAVE YOU HAD THOUGHTS OF HARMING ANYONE ELSE?: NO

## 2025-08-08 SDOH — SOCIAL STABILITY: SOCIAL INSECURITY: DOES ANYONE TRY TO KEEP YOU FROM HAVING/CONTACTING OTHER FRIENDS OR DOING THINGS OUTSIDE YOUR HOME?: NO

## 2025-08-08 SDOH — SOCIAL STABILITY: SOCIAL INSECURITY: HAS ANYONE EVER THREATENED TO HURT YOUR FAMILY OR YOUR PETS?: NO

## 2025-08-08 ASSESSMENT — COGNITIVE AND FUNCTIONAL STATUS - GENERAL
DAILY ACTIVITIY SCORE: 23
MOVING FROM LYING ON BACK TO SITTING ON SIDE OF FLAT BED WITH BEDRAILS: A LITTLE
DRESSING REGULAR LOWER BODY CLOTHING: A LITTLE
DRESSING REGULAR LOWER BODY CLOTHING: A LITTLE
MOBILITY SCORE: 18
DRESSING REGULAR LOWER BODY CLOTHING: A LITTLE
MOVING TO AND FROM BED TO CHAIR: A LITTLE
CLIMB 3 TO 5 STEPS WITH RAILING: A LITTLE
PATIENT BASELINE BEDBOUND: NO
HELP NEEDED FOR BATHING: A LITTLE
MOBILITY SCORE: 23
STANDING UP FROM CHAIR USING ARMS: A LITTLE
CLIMB 3 TO 5 STEPS WITH RAILING: A LITTLE
CLIMB 3 TO 5 STEPS WITH RAILING: A LITTLE
TURNING FROM BACK TO SIDE WHILE IN FLAT BAD: A LITTLE
MOVING TO AND FROM BED TO CHAIR: A LITTLE
PERSONAL GROOMING: A LITTLE
DAILY ACTIVITIY SCORE: 23
MOBILITY SCORE: 21
STANDING UP FROM CHAIR USING ARMS: A LITTLE
TOILETING: A LITTLE
DAILY ACTIVITIY SCORE: 19
DRESSING REGULAR UPPER BODY CLOTHING: A LITTLE
WALKING IN HOSPITAL ROOM: A LITTLE

## 2025-08-08 ASSESSMENT — PAIN DESCRIPTION - DESCRIPTORS
DESCRIPTORS: ACHING;DISCOMFORT;NAGGING
DESCRIPTORS: ACHING;DISCOMFORT;NAGGING
DESCRIPTORS: ACHING;DISCOMFORT

## 2025-08-08 ASSESSMENT — PAIN SCALES - GENERAL
PAINLEVEL_OUTOF10: 10 - WORST POSSIBLE PAIN
PAINLEVEL_OUTOF10: 0 - NO PAIN
PAINLEVEL_OUTOF10: 6
PAINLEVEL_OUTOF10: 7
PAINLEVEL_OUTOF10: 0 - NO PAIN
PAINLEVEL_OUTOF10: 4
PAINLEVEL_OUTOF10: 0 - NO PAIN

## 2025-08-08 ASSESSMENT — PAIN - FUNCTIONAL ASSESSMENT
PAIN_FUNCTIONAL_ASSESSMENT: CPOT (CRITICAL CARE PAIN OBSERVATION TOOL)
PAIN_FUNCTIONAL_ASSESSMENT: CPOT (CRITICAL CARE PAIN OBSERVATION TOOL)
PAIN_FUNCTIONAL_ASSESSMENT: 0-10
PAIN_FUNCTIONAL_ASSESSMENT: CPOT (CRITICAL CARE PAIN OBSERVATION TOOL)
PAIN_FUNCTIONAL_ASSESSMENT: CPOT (CRITICAL CARE PAIN OBSERVATION TOOL)
PAIN_FUNCTIONAL_ASSESSMENT: FLACC (FACE, LEGS, ACTIVITY, CRY, CONSOLABILITY)
PAIN_FUNCTIONAL_ASSESSMENT: 0-10
PAIN_FUNCTIONAL_ASSESSMENT: CPOT (CRITICAL CARE PAIN OBSERVATION TOOL)
PAIN_FUNCTIONAL_ASSESSMENT: CPOT (CRITICAL CARE PAIN OBSERVATION TOOL)
PAIN_FUNCTIONAL_ASSESSMENT: FLACC (FACE, LEGS, ACTIVITY, CRY, CONSOLABILITY)
PAIN_FUNCTIONAL_ASSESSMENT: 0-10
PAIN_FUNCTIONAL_ASSESSMENT: CPOT (CRITICAL CARE PAIN OBSERVATION TOOL)
PAIN_FUNCTIONAL_ASSESSMENT: 0-10
PAIN_FUNCTIONAL_ASSESSMENT: 0-10

## 2025-08-08 ASSESSMENT — ACTIVITIES OF DAILY LIVING (ADL)
ASSISTIVE_DEVICE: EYEGLASSES
GROOMING: INDEPENDENT
WALKS IN HOME: INDEPENDENT
BATHING: INDEPENDENT
HEARING - LEFT EAR: FUNCTIONAL
DRESSING YOURSELF: INDEPENDENT
ADEQUATE_TO_COMPLETE_ADL: YES
PATIENT'S MEMORY ADEQUATE TO SAFELY COMPLETE DAILY ACTIVITIES?: YES
LACK_OF_TRANSPORTATION: NO
TOILETING: INDEPENDENT
HEARING - RIGHT EAR: FUNCTIONAL
JUDGMENT_ADEQUATE_SAFELY_COMPLETE_DAILY_ACTIVITIES: YES
FEEDING YOURSELF: INDEPENDENT

## 2025-08-08 ASSESSMENT — LIFESTYLE VARIABLES
SKIP TO QUESTIONS 9-10: 1
AUDIT-C TOTAL SCORE: 1
AUDIT-C TOTAL SCORE: 1
HOW OFTEN DO YOU HAVE A DRINK CONTAINING ALCOHOL: MONTHLY OR LESS
PRESCIPTION_ABUSE_PAST_12_MONTHS: NO
AUDIT-C TOTAL SCORE: 1
SUBSTANCE_ABUSE_PAST_12_MONTHS: NO
HOW MANY STANDARD DRINKS CONTAINING ALCOHOL DO YOU HAVE ON A TYPICAL DAY: 1 OR 2
SKIP TO QUESTIONS 9-10: 1
HOW OFTEN DO YOU HAVE 6 OR MORE DRINKS ON ONE OCCASION: NEVER

## 2025-08-08 ASSESSMENT — PATIENT HEALTH QUESTIONNAIRE - PHQ9
2. FEELING DOWN, DEPRESSED OR HOPELESS: NOT AT ALL
1. LITTLE INTEREST OR PLEASURE IN DOING THINGS: NOT AT ALL
SUM OF ALL RESPONSES TO PHQ9 QUESTIONS 1 & 2: 0

## 2025-08-08 NOTE — NURSING NOTE
Received pt from [facility] via bed pt moaning and not wanting to speak with staff. Explained to pt I will check orders and see when she can have meds next. Oriented to room and call system. IS enc pt not really wanting to return demonstration but rn insisted. SCDS on

## 2025-08-08 NOTE — ANESTHESIA PROCEDURE NOTES
Airway  Date/Time: 8/8/2025 7:38 AM  Reason: elective    Airway not difficult    Staffing  Performed: CRNA   Authorized by: Miguel Saini MD    Performed by: MARYAM Lange-CLAIRE  Patient location during procedure: OR    Patient Condition  Indications for airway management: anesthesia  Patient position: sniffing  MILS maintained throughout  Sedation level: deep     Final Airway Details   Preoxygenated: yes  Final airway type: endotracheal airway  Successful airway: ETT  Cuffed: yes   Successful intubation technique: direct laryngoscopy  Adjuncts used in placement: intubating stylet  Endotracheal tube insertion site: oral  Blade: Rozina  Blade size: #3  ETT size (mm): 7.0  Cormack-Lehane Classification: grade I - full view of glottis  Placement verified by: capnometry   Measured from: lips  ETT to lips (cm): 22  Number of attempts at approach: 1

## 2025-08-08 NOTE — PERIOPERATIVE NURSING NOTE
Patient meets [facility] Discharge Criteria.    1333:  Patient transferred to Division Status Stable. Family Updated.

## 2025-08-08 NOTE — NURSING NOTE
Pt resting in bed. No complaints or concerns. Call light within reach.  Bed alarm in place for pt safety. Pt continues on full liquid diet per order.

## 2025-08-08 NOTE — CARE PLAN
The patient's goals for the shift include      The clinical goals for the shift include      Over the shift, the patient did not make progress toward the following goals. Barriers to progression include Pain control. Recommendations to address these barriers include pain.

## 2025-08-08 NOTE — NURSING NOTE
Assumed care of pt. Pt resting in bed. Complaints of abd discomfort noted.  Call light within reach.

## 2025-08-08 NOTE — DISCHARGE INSTRUCTIONS
Keep incisions clean and dry.  You have dissolvable stitches and waterproof glue over top.  You can shower and let soap and water run over the incisions.  No soaking in a tub or pool until seen in clinic for follow-up.  The glue will fall off in the next 1 to 2 weeks.    Take the pain medication as prescribed.  You should take Tylenol first and if still having pain, then use the medications that were prescribed.  You can use a heat or an ice pack over the incisions for additional relief.    No lifting more than 10 pounds.  This will prevent a hernia from forming at the incisions.    Your appetite may be decreased.  Make sure you are drinking enough liquids to stay hydrated until your appetite returns to normal.  You do not need to force food if you are not feeling hungry.    Your bowel movements may be erratic and abnormal for you in the first few weeks to month after surgery.  You should be able to have bowel movements and eat without having nausea or vomiting. The number, frequency, consistency, color etc. of the stool may change until your bowels settle into their new normal.    If you have any questions, please call the clinic [phone].

## 2025-08-08 NOTE — ANESTHESIA PREPROCEDURE EVALUATION
Patient: Jerilyn Chavira    Procedure Information       Date/Time: 08/08/25 0715    Procedure: RESECTION, INTESTINE, ROBOT-ASSISTED    Location: [facility] / [facility]    Surgeons: Lety Somers MD            Relevant Problems   Cardiac   (+) Essential hypertension   (+) Mixed hyperlipidemia      GI   (+) Malignant neoplasm of ascending colon (Multi)   (+) Malignant neoplasm of colon, unspecified part of colon (Multi)      Liver   (+) Malignant neoplasm of ascending colon (Multi)   (+) Malignant neoplasm of colon, unspecified part of colon (Multi)       Clinical information reviewed:   Tobacco  Allergies  Meds   Med Hx  Surg Hx  OB Status  Fam Hx  Soc   Hx        NPO Detail:  NPO/Void Status  Date of Last Liquid: 08/08/25  Time of Last Liquid: 0430  Date of Last Solid: 08/06/25  Time of Last Solid: 1400  Last Intake Type: Clear fluids  Time of Last Void: 0515         Physical Exam    Airway  Mallampati: II  TM distance: >3 FB  Neck ROM: full     Cardiovascular    Dental    Pulmonary    Abdominal            Anesthesia Plan    History of general anesthesia?: yes  History of complications of general anesthesia?: no    ASA 3     general     intravenous induction   Anesthetic plan and risks discussed with patient.    Plan discussed with CRNA.

## 2025-08-08 NOTE — OP NOTE
RESECTION, INTESTINE, ROBOT-ASSISTED Operative Note     Date: 2025  OR Location: [facility]    Name: Jerilyn Chavira, : 1955, Age: 69 y.o., MRN: [id number], Sex: female    Diagnosis  Pre-op Diagnosis      * Malignant neoplasm of ascending colon (Multi) [C18.2] Post-op Diagnosis     * Malignant neoplasm of ascending colon (Multi) [C18.2]     Procedures  RESECTION, INTESTINE, ROBOT-ASSISTED  89839 - CA LAPS COLECTOMY PRTL W/RMVL TERMINAL ILEUM    RESECTION, INTESTINE, ROBOT-ASSISTED   - CA SURGICAL TECHNIQUES REQUIRING USE OF ROBOTIC SURGICAL SYSTEM (LIST SEPARATELY IN ADDITION TO CODE FOR PRIMARY PROCEDURE)      Surgeons      * Lety Somers - Primary    Resident/Fellow/Other Assistant:  Surgeons and Role:  * No surgeons found with a matching role *    Staff:   Circulator: Arias  Surgical Assistant: Brayden Stephens Person: Kadi Bautista Circulator: Shoshana    Anesthesia Staff: Anesthesiologist: Miguel Saini MD  CRNA: MARYAM Lange-CRNA    Procedure Summary  Anesthesia: General  ASA: III  Estimated Blood Loss: 10mL  Intra-op Medications:   Administrations occurring from 0715 to 1145 on 25:   Medication Name Total Dose   lidocaine-epinephrine (Xylocaine W/EPI) 1 %-1:100,000 injection 40 mL   dexAMETHasone (Decadron) 4 mg/mL IV Syringe 2 mL 8 mg   dexMEDETOMidine 4 mcg/mL in NS syringe 12 mcg   fentaNYL (Sublimaze) injection 50 mcg/mL 200 mcg   indocyanine green (IC-Green) injection 25 mg 10 mL   labetalol (Normodyne,Trandate) injection 10 mg   LR infusion Cannot be calculated   lidocaine (Xylocaine) injection 2 % 100 mg   midazolam (Versed) injection 1 mg/mL 2 mg   propofol (Diprivan) injection 10 mg/mL 200 mg   rocuronium (ZeMuron) 50 mg/5 mL injection 90 mg   sugammadex (Bridion) 200 mg/2 mL injection 200 mg   ceFAZolin (Ancef) 2 g in dextrose (iso) IV 50 mL 2 g   metroNIDAZOLE (Flagyl) 500 mg in sodium chloride (iso)  mL 500 mg              Anesthesia Record                Intraprocedure I/O Totals          Intake    LR infusion 1000.00 mL    Total Intake 1000 mL          Specimen:   ID Type Source Tests Collected by Time   1 : Right colon Tissue COLON - RIGHT HEMICOLECTOMY SURGICAL PATHOLOGY EXAM Lety Somers MD 8/8/2025 1046      Findings: Mass palpated within the cecum    Indications: Jerilyn Chavira is an 69 y.o. female who is having surgery for Malignant neoplasm of ascending colon (Multi) [C18.2].     The patient was seen in the preoperative area. The risks, benefits, complications, treatment options, non-operative alternatives, expected recovery and outcomes were discussed with the patient. The possibilities of reaction to medication, pulmonary aspiration, injury to surrounding structures, bleeding, recurrent infection, the need for additional procedures, failure to diagnose a condition, and creating a complication requiring transfusion or operation were discussed with the patient. The patient concurred with the proposed plan, giving informed consent.  The site of surgery was properly noted/marked if necessary per policy. The patient has been actively warmed in preoperative area. Preoperative antibiotics have been ordered and given within 1 hours of incision. Venous thrombosis prophylaxis have been ordered including bilateral sequential compression devices and chemical prophylaxis    Procedure Details:   The patient was brought back to the operating room placed on the operating table in the supine position.  General anesthesia was induced.  Antibiotics were given and a timeout was performed.  The patient's abdomen was prepped and draped according to standard sterile fashion.  An incision was made over the umbilicus and a Veress needle was used to gain entrance into the abdomen.  The abdomen was insufflated to 15 mmHg.  An 8 mm robotic trocar was placed and the abdomen was inspected.  There were adhesions inferiorly along the midline from her previous incision.  3 additional  robotic trocars were placed, with a 12 mm in the left lower quadrant.  Sharp adhesiolysis with scissors was used to take down the adhesions to the lower anterior abdominal wall which was omentum and subsequently placed in the upper abdomen.  The cecum was identified and there was a firm mass where the serosa was puckered.  The patient was placed in Trendelenburg and right side up and the robot was brought onto the field and docked and I went over to the console for further management.  I started by identifying the ileocolic pedicle and elevating it.  A plane was created to separate out the retroperitoneum.  This was taken out and the duodenum was identified and dropped down and protected.  Once the vascular pedicle was encircled, 2 clips were used to transect the pedicle.  Further dissection dropping down the retroperitoneum was taken up to the hepatic flexure.  A right colic artery was identified and ligated using the vessel sealer.  Once I was up at the hepatic flexure, I was able to break through and come out superior to the bowel.  This plane was then taken out medially just past the hepatic flexure.  An area of transection was identified and the mesentery leading up to this was taken using the vessel sealer.  I then worked proximally and continue the retroperitoneal plane and open the white line of Toldt laterally.  I then identified the ileum and found a point of transection.  The mesentery was taken leading up to here also using the vessel sealer.  ICG was then given by the anesthesia team and a 60 load of the stapler was used to transect both the transverse colon and ileum, ensuring that there was adequate blood supply and both areas lit up appropriately green with ICG.  The the specimen was then placed in the right side of the abdomen.  The ileum was brought up to the transverse colon in an isoperistaltic fashion.  A enterotomy and colotomy was made and another blue load of the stapler was brought onto the  field and placed into each lumen and fired to create an anastomosis.  A 9 inch V-Loc suture was then used to close the common channel in a double layer, first running and then followed by a Lembert stitch.  The anastomosis again lit up green with ICG after the common channel was closed.  The patient was leveled out and the robot was undocked.  I made the left lower quadrant incision in the extraction site.  A small Chaitanya was placed through this.  The specimen was identified and grasped and removed.  The abdomen was reinsufflated and hemostasis was ensured.  The anastomosis laid in the right abdomen without any tension and the omentum was brought down over top.  An 0 PDS was then used to close the anterior rectus sheath at the extraction site in the left lower quadrant.  The skin over all the incisions was closed Monocryl and Dermabond.  Instrument needle sponge counts were correct.  General esthesia was reversed.  She tolerated the procedure well and was transferred to PACU in stable condition.     Evidence of Infection: No   Complications:  None; patient tolerated the procedure well.    Disposition: PACU - hemodynamically stable.  Condition: stable     Colon Resection  Operation performed with curative intent Yes   Tumor Location (select all that apply) Cecum   Extent of colon and vascular resection (select all that apply) Right hemicolectomy - ileocolic, right colic (if present)          Lety Somers  Phone Number: [phone]

## 2025-08-08 NOTE — DISCHARGE SUMMARY
Discharge Diagnosis  Malignant neoplasm of ascending colon (Multi)    Issues Requiring Follow-Up  Intestinal resection     Test Results Pending At Discharge  Pending Labs       No current pending labs.            Hospital Course   Patient presented for scheduled roboic assisted intestinal resection. Tolerated procedure well. Patient admitted to monitor toleration of diet, pain control, return of bowel function. Patient is tolerating regular diet, pain well controlled on current regimen, bowels moving. Patient will be discharged and follow up with our office in 2 weeks as scheduled.       Visit Vitals  /88   Pulse 76   Temp 36 °C (96.8 °F) (Temporal)   Resp 18     There were no vitals filed for this visit.    Immunization History   Administered Date(s) Administered    Moderna SARS-CoV-2 Vaccination 01/04/2021, 02/02/2021       Results  {If you would like to pull in Medications, type .meds     If you would like to pull in Lab results for the last 24 hours, type .[id number]    If you would like to pull in Imaging results, type .imgrslt :99}    Pertinent Physical Exam At Time of Discharge  Physical Exam  HENT:      Head: Atraumatic.      Nose: Nose normal.      Mouth/Throat:      Mouth: Mucous membranes are moist.     Eyes:      Extraocular Movements: Extraocular movements intact.       Cardiovascular:      Rate and Rhythm: Normal rate.   Pulmonary:      Effort: Pulmonary effort is normal. No respiratory distress.   Abdominal:      Palpations: Abdomen is soft.      Comments: Surgical incisions CDI without sxs infection, appropriately tender        Musculoskeletal:         General: Normal range of motion.      Cervical back: Normal range of motion.     Skin:     General: Skin is warm and dry.     Neurological:      General: No focal deficit present.      Mental Status: She is alert and oriented to person, place, and time.     Psychiatric:         Mood and Affect: Mood normal.         Behavior: Behavior normal.          Home Medications     Medication List      START taking these medications     acetaminophen 325 mg tablet; Commonly known as: Tylenol; Take 2 tablets   (650 mg) by mouth every 6 hours if needed for mild pain (1 - 3).   enoxaparin 40 mg/0.4 mL syringe; Commonly known as: Lovenox; Inject 0.4   mL (40 mg) under the skin once daily.   tiZANidine 4 mg tablet; Commonly known as: Zanaflex; Take 1 tablet (4   mg) by mouth every 6 hours if needed for muscle spasms.     CONTINUE taking these medications     CALCIUM-VITAMIN D ORAL   chlorhexidine 0.12 % solution; Commonly known as: Peridex; Use as   directed   hydroCHLOROthiazide 12.5 mg capsule; Commonly known as: Microzide   ibuprofen 200 mg tablet   neomycin 500 mg tablet; Commonly known as: Mycifradin; Take two tablets   by mouth at 1:00pm, 2:00pm and 10:00pm on the day prior to surgery     STOP taking these medications     metroNIDAZOLE 500 mg tablet; Commonly known as: Flagyl     ASK your doctor about these medications     mupirocin 2 % ointment; Commonly known as: Bactroban; Apply to affected   area(s) topically 3 times a day.       Outpatient Follow-Up  Future Appointments   Date Time Provider Department Center   8/21/2025  8:30 AM Lety Somers MD [id number] Estevan Boston PA-C

## 2025-08-08 NOTE — PERIOPERATIVE NURSING NOTE
Patient received to [facility] # 6 from OR. Anesthesia at bedside. Report received. Initial assessment complete. VSS on Cardiac Monitor. Patient sleeping appears comfortable at present. No S/S of Resp. Distress noted.

## 2025-08-08 NOTE — ANESTHESIA POSTPROCEDURE EVALUATION
Patient: Jerilyn Chavira    Procedure Summary       Date: 08/08/25 Room / Location: [facility] OR 12 / [facility]    Anesthesia Start: 0729 Anesthesia Stop: 1126    Procedure: RESECTION, INTESTINE, ROBOT-ASSISTED Diagnosis:       Malignant neoplasm of ascending colon (Multi)      (Malignant neoplasm of ascending colon (Multi) [C18.2])    Surgeons: Lety Somers MD Responsible Provider: Miguel Saini MD    Anesthesia Type: general ASA Status: 3            Anesthesia Type: general    Vitals Value Taken Time   /74 08/08/25 11:46   Temp 36.2 °C (97.2 °F) 08/08/25 11:23   Pulse 58 08/08/25 11:48   Resp 23 08/08/25 11:48   SpO2 99 % 08/08/25 11:48   Vitals shown include unfiled device data.    Anesthesia Post Evaluation    Patient location during evaluation: PACU  Patient participation: complete - patient participated  Level of consciousness: awake  Pain management: adequate  Airway patency: patent  Cardiovascular status: acceptable  Respiratory status: acceptable  Hydration status: acceptable  Postoperative Nausea and Vomiting: none        No notable events documented.

## 2025-08-09 ENCOUNTER — PHARMACY VISIT (OUTPATIENT)
Dept: PHARMACY | Facility: CLINIC | Age: 70
End: 2025-08-09
Payer: COMMERCIAL

## 2025-08-09 LAB
ANION GAP SERPL CALCULATED.3IONS-SCNC: 8 MMOL/L (ref 10–20)
BASOPHILS # BLD AUTO: 0.03 X10*3/UL (ref 0–0.1)
BASOPHILS NFR BLD AUTO: 0.2 %
BUN SERPL-MCNC: 14 MG/DL (ref 6–23)
CALCIUM SERPL-MCNC: 8.9 MG/DL (ref 8.6–10.3)
CHLORIDE SERPL-SCNC: 101 MMOL/L (ref 98–107)
CO2 SERPL-SCNC: 32 MMOL/L (ref 21–32)
CREAT SERPL-MCNC: 0.65 MG/DL (ref 0.5–1.05)
EGFRCR SERPLBLD CKD-EPI 2021: >90 ML/MIN/1.73M*2
EOSINOPHIL # BLD AUTO: 0.01 X10*3/UL (ref 0–0.7)
EOSINOPHIL NFR BLD AUTO: 0.1 %
ERYTHROCYTE [DISTWIDTH] IN BLOOD BY AUTOMATED COUNT: 13.2 % (ref 11.5–14.5)
GLUCOSE SERPL-MCNC: 92 MG/DL (ref 74–99)
HCT VFR BLD AUTO: 34.5 % (ref 36–46)
HGB BLD-MCNC: 11 G/DL (ref 12–16)
IMM GRANULOCYTES # BLD AUTO: 0.04 X10*3/UL (ref 0–0.7)
IMM GRANULOCYTES NFR BLD AUTO: 0.3 % (ref 0–0.9)
LYMPHOCYTES # BLD AUTO: 2.77 X10*3/UL (ref 1.2–4.8)
LYMPHOCYTES NFR BLD AUTO: 23 %
MCH RBC QN AUTO: 28.9 PG (ref 26–34)
MCHC RBC AUTO-ENTMCNC: 31.9 G/DL (ref 32–36)
MCV RBC AUTO: 91 FL (ref 80–100)
MONOCYTES # BLD AUTO: 1.15 X10*3/UL (ref 0.1–1)
MONOCYTES NFR BLD AUTO: 9.5 %
NEUTROPHILS # BLD AUTO: 8.06 X10*3/UL (ref 1.2–7.7)
NEUTROPHILS NFR BLD AUTO: 66.9 %
NRBC BLD-RTO: 0 /100 WBCS (ref 0–0)
PLATELET # BLD AUTO: 361 X10*3/UL (ref 150–450)
POTASSIUM SERPL-SCNC: 3.1 MMOL/L (ref 3.5–5.3)
RBC # BLD AUTO: 3.81 X10*6/UL (ref 4–5.2)
SODIUM SERPL-SCNC: 138 MMOL/L (ref 136–145)
WBC # BLD AUTO: 12.1 X10*3/UL (ref 4.4–11.3)

## 2025-08-09 PROCEDURE — 1100000001 HC PRIVATE ROOM DAILY

## 2025-08-09 PROCEDURE — 80048 BASIC METABOLIC PNL TOTAL CA: CPT | Performed by: STUDENT IN AN ORGANIZED HEALTH CARE EDUCATION/TRAINING PROGRAM

## 2025-08-09 PROCEDURE — 2500000004 HC RX 250 GENERAL PHARMACY W/ HCPCS (ALT 636 FOR OP/ED): Performed by: STUDENT IN AN ORGANIZED HEALTH CARE EDUCATION/TRAINING PROGRAM

## 2025-08-09 PROCEDURE — 85025 COMPLETE CBC W/AUTO DIFF WBC: CPT | Performed by: STUDENT IN AN ORGANIZED HEALTH CARE EDUCATION/TRAINING PROGRAM

## 2025-08-09 PROCEDURE — 36415 COLL VENOUS BLD VENIPUNCTURE: CPT | Performed by: STUDENT IN AN ORGANIZED HEALTH CARE EDUCATION/TRAINING PROGRAM

## 2025-08-09 PROCEDURE — 2500000002 HC RX 250 W HCPCS SELF ADMINISTERED DRUGS (ALT 637 FOR MEDICARE OP, ALT 636 FOR OP/ED): Performed by: STUDENT IN AN ORGANIZED HEALTH CARE EDUCATION/TRAINING PROGRAM

## 2025-08-09 PROCEDURE — 99024 POSTOP FOLLOW-UP VISIT: CPT | Performed by: STUDENT IN AN ORGANIZED HEALTH CARE EDUCATION/TRAINING PROGRAM

## 2025-08-09 PROCEDURE — 2500000001 HC RX 250 WO HCPCS SELF ADMINISTERED DRUGS (ALT 637 FOR MEDICARE OP): Performed by: STUDENT IN AN ORGANIZED HEALTH CARE EDUCATION/TRAINING PROGRAM

## 2025-08-09 RX ORDER — POTASSIUM CHLORIDE 20 MEQ/1
40 TABLET, EXTENDED RELEASE ORAL 2 TIMES DAILY
Status: COMPLETED | OUTPATIENT
Start: 2025-08-09 | End: 2025-08-09

## 2025-08-09 RX ADMIN — TIZANIDINE 2 MG: 2 TABLET ORAL at 14:23

## 2025-08-09 RX ADMIN — TRAMADOL HYDROCHLORIDE 50 MG: 50 TABLET, COATED ORAL at 11:12

## 2025-08-09 RX ADMIN — POTASSIUM CHLORIDE 40 MEQ: 1500 TABLET, EXTENDED RELEASE ORAL at 20:41

## 2025-08-09 RX ADMIN — ONDANSETRON 4 MG: 2 INJECTION, SOLUTION INTRAMUSCULAR; INTRAVENOUS at 11:12

## 2025-08-09 RX ADMIN — POTASSIUM CHLORIDE 40 MEQ: 1500 TABLET, EXTENDED RELEASE ORAL at 09:05

## 2025-08-09 RX ADMIN — ENOXAPARIN SODIUM 40 MG: 40 INJECTION SUBCUTANEOUS at 09:05

## 2025-08-09 RX ADMIN — ACETAMINOPHEN 325 MG: 325 TABLET ORAL at 14:23

## 2025-08-09 RX ADMIN — ACETAMINOPHEN 650 MG: 325 TABLET ORAL at 20:41

## 2025-08-09 RX ADMIN — TRAMADOL HYDROCHLORIDE 50 MG: 50 TABLET, COATED ORAL at 20:41

## 2025-08-09 RX ADMIN — ONDANSETRON 4 MG: 2 INJECTION, SOLUTION INTRAMUSCULAR; INTRAVENOUS at 05:46

## 2025-08-09 RX ADMIN — TIZANIDINE 2 MG: 2 TABLET ORAL at 20:41

## 2025-08-09 ASSESSMENT — COGNITIVE AND FUNCTIONAL STATUS - GENERAL
TOILETING: A LITTLE
DRESSING REGULAR UPPER BODY CLOTHING: A LITTLE
PERSONAL GROOMING: A LITTLE
MOVING TO AND FROM BED TO CHAIR: A LITTLE
DRESSING REGULAR LOWER BODY CLOTHING: A LITTLE
HELP NEEDED FOR BATHING: A LITTLE
WALKING IN HOSPITAL ROOM: A LITTLE
EATING MEALS: A LITTLE
TURNING FROM BACK TO SIDE WHILE IN FLAT BAD: A LITTLE
DAILY ACTIVITIY SCORE: 18
CLIMB 3 TO 5 STEPS WITH RAILING: A LITTLE
MOVING FROM LYING ON BACK TO SITTING ON SIDE OF FLAT BED WITH BEDRAILS: A LITTLE
MOBILITY SCORE: 18
STANDING UP FROM CHAIR USING ARMS: A LITTLE

## 2025-08-09 ASSESSMENT — PAIN DESCRIPTION - LOCATION
LOCATION: BACK
LOCATION: BACK

## 2025-08-09 ASSESSMENT — PAIN DESCRIPTION - ORIENTATION: ORIENTATION: RIGHT

## 2025-08-09 ASSESSMENT — PAIN DESCRIPTION - DESCRIPTORS
DESCRIPTORS: ACHING;DISCOMFORT
DESCRIPTORS: ACHING

## 2025-08-09 ASSESSMENT — PAIN SCALES - GENERAL
PAINLEVEL_OUTOF10: 10 - WORST POSSIBLE PAIN
PAINLEVEL_OUTOF10: 0 - NO PAIN

## 2025-08-09 ASSESSMENT — PAIN - FUNCTIONAL ASSESSMENT
PAIN_FUNCTIONAL_ASSESSMENT: 0-10

## 2025-08-09 NOTE — CARE PLAN
"The patient's goals for the shift include Try and sleep. \"I'm exhausted\"    The clinical goals for the shift include promote RBF    Over the shift, the patient did not make progress toward the following goals. Barriers to progression include pain. Recommendations to address these barriers include pain medications as ordered and as pt accept, encourage mobility.    "

## 2025-08-09 NOTE — NURSING NOTE
Pt refuses Tylenol and tizanidine d/t concerns that those medications had previously upset her stomach. Pt agreeable to other scheduled medications at this time. See MAR. Pt reporting pain to ABD, declines medications, cold or warm compress at this time. Pt repositions self. Waffle mattress for comfort. This nurse encouraged pt to ambulate to promote bowel motility and use IS. Continuing to monitor

## 2025-08-09 NOTE — NURSING NOTE
Assumed care of this pt. Pt is lying in bed, breathing even and unlabored, NC in place. NAD. BSSR complete. Bed low, call light and belongings in reach. Continuing to monitor

## 2025-08-09 NOTE — PROGRESS NOTES
Spiritual Care Visit  Spiritual Care Request    Reason for Visit:  Routine Visit: Introduction     Request Received From:       Focus of Care:  Visited With: Patient not available         Refer to :          Spiritual Care Assessment    Spiritual Assessment:                      Care Provided:       Sense of Community and or Restorationism Affiliation:  Religious   Values/Beliefs  Spiritual Requests During Hospitalization: Jerilyn was having patient care today.     Addressed Needs/Concerns and/or Christine Through:          Outcome:        Advance Directives:         Spiritual Care Annotation    Annotation:  Jerilyn was having patient care today.  Denys Corrales

## 2025-08-09 NOTE — CARE PLAN
"The patient's goals for the shift include Try and sleep. \"I'm exhausted\"    The clinical goals for the shift include Pain control, walks    "

## 2025-08-09 NOTE — NURSING NOTE
Pt takes 325mg of Tylenol instead of ordered 650mg per her request as she states too much Tylenol upsets her stomach, see MAR for admin record.

## 2025-08-09 NOTE — PROGRESS NOTES
"Jerilyn Chavira is a 69 y.o. female on day 1 of admission presenting with Malignant neoplasm of ascending colon (Multi).    Subjective   Cramping abdominal pain, nausea with pain meds. No flatus/bm       Objective     Physical Exam  Abdominal:      Palpations: Abdomen is soft.      Comments: Incisions c/d/I with dermabond         Last Recorded Vitals  Blood pressure 166/73, pulse 59, temperature 36.8 °C (98.2 °F), temperature source Oral, resp. rate 18, height 1.676 m (5' 6\"), weight 70.3 kg (154 lb 15.7 oz), SpO2 97%.  Intake/Output last 3 Shifts:  I/O last 3 completed shifts:  In: 1350 (19.2 mL/kg) [P.O.:50; I.V.:1300 (18.5 mL/kg)]  Out: 700 (10 mL/kg) [Urine:700 (0.3 mL/kg/hr)]  Weight: 70.3 kg     Relevant Results  Results for orders placed or performed during the hospital encounter of 08/08/25 (from the past 24 hours)   CBC and Auto Differential   Result Value Ref Range    WBC 12.1 (H) 4.4 - 11.3 x10*3/uL    nRBC 0.0 0.0 - 0.0 /100 WBCs    RBC 3.81 (L) 4.00 - 5.20 x10*6/uL    Hemoglobin 11.0 (L) 12.0 - 16.0 g/dL    Hematocrit 34.5 (L) 36.0 - 46.0 %    MCV 91 80 - 100 fL    MCH 28.9 26.0 - 34.0 pg    MCHC 31.9 (L) 32.0 - 36.0 g/dL    RDW 13.2 11.5 - 14.5 %    Platelets 361 150 - 450 x10*3/uL    Neutrophils % 66.9 40.0 - 80.0 %    Immature Granulocytes %, Automated 0.3 0.0 - 0.9 %    Lymphocytes % 23.0 13.0 - 44.0 %    Monocytes % 9.5 2.0 - 10.0 %    Eosinophils % 0.1 0.0 - 6.0 %    Basophils % 0.2 0.0 - 2.0 %    Neutrophils Absolute 8.06 (H) 1.20 - 7.70 x10*3/uL    Immature Granulocytes Absolute, Automated 0.04 0.00 - 0.70 x10*3/uL    Lymphocytes Absolute 2.77 1.20 - 4.80 x10*3/uL    Monocytes Absolute 1.15 (H) 0.10 - 1.00 x10*3/uL    Eosinophils Absolute 0.01 0.00 - 0.70 x10*3/uL    Basophils Absolute 0.03 0.00 - 0.10 x10*3/uL   Basic Metabolic Panel   Result Value Ref Range    Glucose 92 74 - 99 mg/dL    Sodium 138 136 - 145 mmol/L    Potassium 3.1 (L) 3.5 - 5.3 mmol/L    Chloride 101 98 - 107 mmol/L    " Bicarbonate 32 21 - 32 mmol/L    Anion Gap 8 (L) 10 - 20 mmol/L    Urea Nitrogen 14 6 - 23 mg/dL    Creatinine 0.65 0.50 - 1.05 mg/dL    eGFR >90 >60 mL/min/1.73m*2    Calcium 8.9 8.6 - 10.3 mg/dL         Assessment & Plan  Malignant neoplasm of ascending colon (Multi)    Malignant neoplasm of colon, unspecified part of colon (Multi)    S/p robotic assisted right colectomy 8/8/25 for adenocarcinoma    Advance to gen diet  K repletion  Home meds  Lovenox  Awaiting bowel fxn      Lety Somers MD

## 2025-08-10 VITALS
HEART RATE: 61 BPM | TEMPERATURE: 97.5 F | WEIGHT: 140 LBS | SYSTOLIC BLOOD PRESSURE: 133 MMHG | DIASTOLIC BLOOD PRESSURE: 64 MMHG | BODY MASS INDEX: 22.5 KG/M2 | RESPIRATION RATE: 16 BRPM | OXYGEN SATURATION: 93 % | HEIGHT: 66 IN

## 2025-08-10 LAB
ANION GAP SERPL CALCULATED.3IONS-SCNC: 8 MMOL/L (ref 10–20)
BASOPHILS # BLD AUTO: 0.08 X10*3/UL (ref 0–0.1)
BASOPHILS NFR BLD AUTO: 0.8 %
BUN SERPL-MCNC: 18 MG/DL (ref 6–23)
CALCIUM SERPL-MCNC: 8.6 MG/DL (ref 8.6–10.3)
CHLORIDE SERPL-SCNC: 104 MMOL/L (ref 98–107)
CO2 SERPL-SCNC: 29 MMOL/L (ref 21–32)
CREAT SERPL-MCNC: 0.58 MG/DL (ref 0.5–1.05)
EGFRCR SERPLBLD CKD-EPI 2021: >90 ML/MIN/1.73M*2
EOSINOPHIL # BLD AUTO: 0.11 X10*3/UL (ref 0–0.7)
EOSINOPHIL NFR BLD AUTO: 1.1 %
ERYTHROCYTE [DISTWIDTH] IN BLOOD BY AUTOMATED COUNT: 13.3 % (ref 11.5–14.5)
GLUCOSE SERPL-MCNC: 82 MG/DL (ref 74–99)
HCT VFR BLD AUTO: 32.7 % (ref 36–46)
HGB BLD-MCNC: 10.2 G/DL (ref 12–16)
IMM GRANULOCYTES # BLD AUTO: 0.03 X10*3/UL (ref 0–0.7)
IMM GRANULOCYTES NFR BLD AUTO: 0.3 % (ref 0–0.9)
LYMPHOCYTES # BLD AUTO: 2.91 X10*3/UL (ref 1.2–4.8)
LYMPHOCYTES NFR BLD AUTO: 28.2 %
MCH RBC QN AUTO: 28.7 PG (ref 26–34)
MCHC RBC AUTO-ENTMCNC: 31.2 G/DL (ref 32–36)
MCV RBC AUTO: 92 FL (ref 80–100)
MONOCYTES # BLD AUTO: 0.87 X10*3/UL (ref 0.1–1)
MONOCYTES NFR BLD AUTO: 8.4 %
NEUTROPHILS # BLD AUTO: 6.32 X10*3/UL (ref 1.2–7.7)
NEUTROPHILS NFR BLD AUTO: 61.2 %
NRBC BLD-RTO: 0 /100 WBCS (ref 0–0)
PLATELET # BLD AUTO: 344 X10*3/UL (ref 150–450)
POTASSIUM SERPL-SCNC: 3.7 MMOL/L (ref 3.5–5.3)
RBC # BLD AUTO: 3.56 X10*6/UL (ref 4–5.2)
SODIUM SERPL-SCNC: 137 MMOL/L (ref 136–145)
WBC # BLD AUTO: 10.3 X10*3/UL (ref 4.4–11.3)

## 2025-08-10 PROCEDURE — 1100000001 HC PRIVATE ROOM DAILY

## 2025-08-10 PROCEDURE — 2500000002 HC RX 250 W HCPCS SELF ADMINISTERED DRUGS (ALT 637 FOR MEDICARE OP, ALT 636 FOR OP/ED): Performed by: STUDENT IN AN ORGANIZED HEALTH CARE EDUCATION/TRAINING PROGRAM

## 2025-08-10 PROCEDURE — 99231 SBSQ HOSP IP/OBS SF/LOW 25: CPT | Performed by: SURGERY

## 2025-08-10 PROCEDURE — 2500000004 HC RX 250 GENERAL PHARMACY W/ HCPCS (ALT 636 FOR OP/ED): Performed by: STUDENT IN AN ORGANIZED HEALTH CARE EDUCATION/TRAINING PROGRAM

## 2025-08-10 PROCEDURE — 2500000001 HC RX 250 WO HCPCS SELF ADMINISTERED DRUGS (ALT 637 FOR MEDICARE OP): Performed by: STUDENT IN AN ORGANIZED HEALTH CARE EDUCATION/TRAINING PROGRAM

## 2025-08-10 PROCEDURE — 85025 COMPLETE CBC W/AUTO DIFF WBC: CPT | Performed by: STUDENT IN AN ORGANIZED HEALTH CARE EDUCATION/TRAINING PROGRAM

## 2025-08-10 PROCEDURE — 36415 COLL VENOUS BLD VENIPUNCTURE: CPT | Performed by: STUDENT IN AN ORGANIZED HEALTH CARE EDUCATION/TRAINING PROGRAM

## 2025-08-10 PROCEDURE — 80048 BASIC METABOLIC PNL TOTAL CA: CPT | Performed by: STUDENT IN AN ORGANIZED HEALTH CARE EDUCATION/TRAINING PROGRAM

## 2025-08-10 RX ADMIN — TRAMADOL HYDROCHLORIDE 50 MG: 50 TABLET, COATED ORAL at 10:34

## 2025-08-10 RX ADMIN — TIZANIDINE 2 MG: 2 TABLET ORAL at 20:52

## 2025-08-10 RX ADMIN — ACETAMINOPHEN 650 MG: 325 TABLET ORAL at 15:00

## 2025-08-10 RX ADMIN — ACETAMINOPHEN 650 MG: 325 TABLET ORAL at 08:54

## 2025-08-10 RX ADMIN — TIZANIDINE 2 MG: 2 TABLET ORAL at 01:50

## 2025-08-10 RX ADMIN — TRAMADOL HYDROCHLORIDE 50 MG: 50 TABLET, COATED ORAL at 03:44

## 2025-08-10 RX ADMIN — ACETAMINOPHEN 650 MG: 325 TABLET ORAL at 20:52

## 2025-08-10 RX ADMIN — ACETAMINOPHEN 650 MG: 325 TABLET ORAL at 01:50

## 2025-08-10 RX ADMIN — ENOXAPARIN SODIUM 40 MG: 40 INJECTION SUBCUTANEOUS at 08:54

## 2025-08-10 RX ADMIN — TIZANIDINE 2 MG: 2 TABLET ORAL at 08:54

## 2025-08-10 RX ADMIN — TIZANIDINE 2 MG: 2 TABLET ORAL at 15:00

## 2025-08-10 ASSESSMENT — COGNITIVE AND FUNCTIONAL STATUS - GENERAL
STANDING UP FROM CHAIR USING ARMS: A LITTLE
TURNING FROM BACK TO SIDE WHILE IN FLAT BAD: A LITTLE
CLIMB 3 TO 5 STEPS WITH RAILING: A LITTLE
STANDING UP FROM CHAIR USING ARMS: A LITTLE
DRESSING REGULAR LOWER BODY CLOTHING: A LITTLE
DAILY ACTIVITIY SCORE: 24
MOBILITY SCORE: 18
DAILY ACTIVITIY SCORE: 23
WALKING IN HOSPITAL ROOM: A LITTLE
MOVING FROM LYING ON BACK TO SITTING ON SIDE OF FLAT BED WITH BEDRAILS: A LITTLE
MOVING TO AND FROM BED TO CHAIR: A LITTLE
CLIMB 3 TO 5 STEPS WITH RAILING: A LITTLE
MOBILITY SCORE: 22

## 2025-08-10 ASSESSMENT — PAIN DESCRIPTION - LOCATION
LOCATION: SHOULDER

## 2025-08-10 ASSESSMENT — PAIN DESCRIPTION - DESCRIPTORS
DESCRIPTORS: ACHING
DESCRIPTORS: ACHING
DESCRIPTORS: DISCOMFORT
DESCRIPTORS: ACHING
DESCRIPTORS: DISCOMFORT

## 2025-08-10 ASSESSMENT — PAIN - FUNCTIONAL ASSESSMENT
PAIN_FUNCTIONAL_ASSESSMENT: 0-10

## 2025-08-10 ASSESSMENT — PAIN SCALES - GENERAL
PAINLEVEL_OUTOF10: 0 - NO PAIN
PAINLEVEL_OUTOF10: 4
PAINLEVEL_OUTOF10: 2
PAINLEVEL_OUTOF10: 5 - MODERATE PAIN
PAINLEVEL_OUTOF10: 2
PAINLEVEL_OUTOF10: 0 - NO PAIN
PAINLEVEL_OUTOF10: 6

## 2025-08-10 ASSESSMENT — PAIN DESCRIPTION - ORIENTATION
ORIENTATION: RIGHT

## 2025-08-10 NOTE — PROGRESS NOTES
"Jerilyn Chavira is a 69 y.o. female on day 2 of admission presenting with Malignant neoplasm of ascending colon (Multi).    Subjective   Had bad Right shoulder pain yesterday evening.  Improved now.  Tolerating small amounts of her diet and liquids.  No BM or flatus        Objective     Physical Exam  Abdominal:      Palpations: Abdomen is soft.      Comments: Incisions c/d/I with dermabond         Last Recorded Vitals  Blood pressure 146/66, pulse 61, temperature 36.5 °C (97.7 °F), temperature source Oral, resp. rate 16, height 1.676 m (5' 6\"), weight 63.5 kg (140 lb), SpO2 93%.  Intake/Output last 3 Shifts:  I/O last 3 completed shifts:  In: 900 (14.2 mL/kg) [P.O.:900]  Out: 1475 (23.2 mL/kg) [Urine:1475 (0.6 mL/kg/hr)]  Weight: 63.5 kg     Relevant Results  Results for orders placed or performed during the hospital encounter of 08/08/25 (from the past 24 hours)   CBC and Auto Differential   Result Value Ref Range    WBC 10.3 4.4 - 11.3 x10*3/uL    nRBC 0.0 0.0 - 0.0 /100 WBCs    RBC 3.56 (L) 4.00 - 5.20 x10*6/uL    Hemoglobin 10.2 (L) 12.0 - 16.0 g/dL    Hematocrit 32.7 (L) 36.0 - 46.0 %    MCV 92 80 - 100 fL    MCH 28.7 26.0 - 34.0 pg    MCHC 31.2 (L) 32.0 - 36.0 g/dL    RDW 13.3 11.5 - 14.5 %    Platelets 344 150 - 450 x10*3/uL    Neutrophils % 61.2 40.0 - 80.0 %    Immature Granulocytes %, Automated 0.3 0.0 - 0.9 %    Lymphocytes % 28.2 13.0 - 44.0 %    Monocytes % 8.4 2.0 - 10.0 %    Eosinophils % 1.1 0.0 - 6.0 %    Basophils % 0.8 0.0 - 2.0 %    Neutrophils Absolute 6.32 1.20 - 7.70 x10*3/uL    Immature Granulocytes Absolute, Automated 0.03 0.00 - 0.70 x10*3/uL    Lymphocytes Absolute 2.91 1.20 - 4.80 x10*3/uL    Monocytes Absolute 0.87 0.10 - 1.00 x10*3/uL    Eosinophils Absolute 0.11 0.00 - 0.70 x10*3/uL    Basophils Absolute 0.08 0.00 - 0.10 x10*3/uL   Basic Metabolic Panel   Result Value Ref Range    Glucose 82 74 - 99 mg/dL    Sodium 137 136 - 145 mmol/L    Potassium 3.7 3.5 - 5.3 mmol/L    Chloride 104 " 98 - 107 mmol/L    Bicarbonate 29 21 - 32 mmol/L    Anion Gap 8 (L) 10 - 20 mmol/L    Urea Nitrogen 18 6 - 23 mg/dL    Creatinine 0.58 0.50 - 1.05 mg/dL    eGFR >90 >60 mL/min/1.73m*2    Calcium 8.6 8.6 - 10.3 mg/dL         Assessment & Plan  Malignant neoplasm of ascending colon (Multi)    Malignant neoplasm of colon, unspecified part of colon (Multi)    S/p robotic assisted right colectomy 8/8/25 for adenocarcinoma    Advance to gen diet  K repletion  Home meds  Lovenox  Awaiting bowel fxn - patient does not yet feel ready for discharge.  She is worried about having a bowel movement      Giovani Segura MD

## 2025-08-10 NOTE — CARE PLAN
The patient's goals for the shift include reduce pain    The clinical goals for the shift include pain management    Over the shift, the patient did not make progress toward the following goals. Barriers to progression include increased pain, decreased mobility. Recommendations to address these barriers include adequate pain management.

## 2025-08-10 NOTE — NURSING NOTE
Assumed care of patient. Patient resting in bed during BSSR. Patient has 4 lap sites with derma bond and healing. Call light within reach. No concerns at this time. Plan of care ongoing.

## 2025-08-10 NOTE — CARE PLAN
The patient's goals for the shift include reduce pain    The clinical goals for the shift include pain management and comfort

## 2025-08-11 VITALS
WEIGHT: 147 LBS | DIASTOLIC BLOOD PRESSURE: 61 MMHG | HEART RATE: 64 BPM | BODY MASS INDEX: 23.63 KG/M2 | RESPIRATION RATE: 18 BRPM | OXYGEN SATURATION: 96 % | HEIGHT: 66 IN | SYSTOLIC BLOOD PRESSURE: 148 MMHG | TEMPERATURE: 97.9 F

## 2025-08-11 PROBLEM — Z29.9 ENCOUNTER FOR DEEP VEIN THROMBOSIS (DVT) PROPHYLAXIS: Status: ACTIVE | Noted: 2025-08-11

## 2025-08-11 LAB
ANION GAP SERPL CALCULATED.3IONS-SCNC: 9 MMOL/L (ref 10–20)
BASOPHILS # BLD AUTO: 0.07 X10*3/UL (ref 0–0.1)
BASOPHILS NFR BLD AUTO: 0.9 %
BUN SERPL-MCNC: 16 MG/DL (ref 6–23)
CALCIUM SERPL-MCNC: 8.7 MG/DL (ref 8.6–10.3)
CHLORIDE SERPL-SCNC: 104 MMOL/L (ref 98–107)
CO2 SERPL-SCNC: 29 MMOL/L (ref 21–32)
CREAT SERPL-MCNC: 0.6 MG/DL (ref 0.5–1.05)
EGFRCR SERPLBLD CKD-EPI 2021: >90 ML/MIN/1.73M*2
EOSINOPHIL # BLD AUTO: 0.28 X10*3/UL (ref 0–0.7)
EOSINOPHIL NFR BLD AUTO: 3.5 %
ERYTHROCYTE [DISTWIDTH] IN BLOOD BY AUTOMATED COUNT: 13.3 % (ref 11.5–14.5)
GLUCOSE SERPL-MCNC: 86 MG/DL (ref 74–99)
HCT VFR BLD AUTO: 31.9 % (ref 36–46)
HGB BLD-MCNC: 10.2 G/DL (ref 12–16)
IMM GRANULOCYTES # BLD AUTO: 0.08 X10*3/UL (ref 0–0.7)
IMM GRANULOCYTES NFR BLD AUTO: 1 % (ref 0–0.9)
LYMPHOCYTES # BLD AUTO: 2.49 X10*3/UL (ref 1.2–4.8)
LYMPHOCYTES NFR BLD AUTO: 31.4 %
MCH RBC QN AUTO: 28.6 PG (ref 26–34)
MCHC RBC AUTO-ENTMCNC: 32 G/DL (ref 32–36)
MCV RBC AUTO: 89 FL (ref 80–100)
MONOCYTES # BLD AUTO: 0.66 X10*3/UL (ref 0.1–1)
MONOCYTES NFR BLD AUTO: 8.3 %
NEUTROPHILS # BLD AUTO: 4.36 X10*3/UL (ref 1.2–7.7)
NEUTROPHILS NFR BLD AUTO: 54.9 %
NRBC BLD-RTO: 0 /100 WBCS (ref 0–0)
PLATELET # BLD AUTO: 342 X10*3/UL (ref 150–450)
POTASSIUM SERPL-SCNC: 3.7 MMOL/L (ref 3.5–5.3)
RBC # BLD AUTO: 3.57 X10*6/UL (ref 4–5.2)
SODIUM SERPL-SCNC: 138 MMOL/L (ref 136–145)
WBC # BLD AUTO: 7.9 X10*3/UL (ref 4.4–11.3)

## 2025-08-11 PROCEDURE — 2500000002 HC RX 250 W HCPCS SELF ADMINISTERED DRUGS (ALT 637 FOR MEDICARE OP, ALT 636 FOR OP/ED): Performed by: STUDENT IN AN ORGANIZED HEALTH CARE EDUCATION/TRAINING PROGRAM

## 2025-08-11 PROCEDURE — 2500000001 HC RX 250 WO HCPCS SELF ADMINISTERED DRUGS (ALT 637 FOR MEDICARE OP): Performed by: STUDENT IN AN ORGANIZED HEALTH CARE EDUCATION/TRAINING PROGRAM

## 2025-08-11 PROCEDURE — 80048 BASIC METABOLIC PNL TOTAL CA: CPT | Performed by: STUDENT IN AN ORGANIZED HEALTH CARE EDUCATION/TRAINING PROGRAM

## 2025-08-11 PROCEDURE — 85025 COMPLETE CBC W/AUTO DIFF WBC: CPT | Performed by: STUDENT IN AN ORGANIZED HEALTH CARE EDUCATION/TRAINING PROGRAM

## 2025-08-11 PROCEDURE — 99024 POSTOP FOLLOW-UP VISIT: CPT | Performed by: NURSE PRACTITIONER

## 2025-08-11 PROCEDURE — 2500000004 HC RX 250 GENERAL PHARMACY W/ HCPCS (ALT 636 FOR OP/ED): Performed by: STUDENT IN AN ORGANIZED HEALTH CARE EDUCATION/TRAINING PROGRAM

## 2025-08-11 PROCEDURE — 36415 COLL VENOUS BLD VENIPUNCTURE: CPT | Performed by: STUDENT IN AN ORGANIZED HEALTH CARE EDUCATION/TRAINING PROGRAM

## 2025-08-11 RX ADMIN — ACETAMINOPHEN 650 MG: 325 TABLET ORAL at 01:44

## 2025-08-11 RX ADMIN — ACETAMINOPHEN 650 MG: 325 TABLET ORAL at 08:33

## 2025-08-11 RX ADMIN — TIZANIDINE 2 MG: 2 TABLET ORAL at 01:44

## 2025-08-11 RX ADMIN — TIZANIDINE 2 MG: 2 TABLET ORAL at 08:33

## 2025-08-11 RX ADMIN — ENOXAPARIN SODIUM 40 MG: 40 INJECTION SUBCUTANEOUS at 08:33

## 2025-08-11 ASSESSMENT — COGNITIVE AND FUNCTIONAL STATUS - GENERAL
MOBILITY SCORE: 24
DAILY ACTIVITIY SCORE: 24

## 2025-08-11 ASSESSMENT — PAIN SCALES - GENERAL: PAINLEVEL_OUTOF10: 0 - NO PAIN

## 2025-08-11 ASSESSMENT — PAIN - FUNCTIONAL ASSESSMENT: PAIN_FUNCTIONAL_ASSESSMENT: 0-10

## 2025-08-11 NOTE — CARE PLAN
The patient's goals for the shift include rest    The clinical goals for the shift include pain management    Over the shift, the patient did not make progress toward the following goals. Barriers to progression include increased pain and abdominal distention. Recommendations to address these barriers include walking and adequate pain management.

## 2025-08-11 NOTE — ASSESSMENT & PLAN NOTE
Resolved status post robotic assisted right colectomy 8/8 for adenocarcinoma  Vital signs stable  Labs as above  Regular diet with Willem  I-S  Plan on most likely discharging today after discussing with Dr Somers

## 2025-08-11 NOTE — PROGRESS NOTES
Jerilyn Chavira is a 69 y.o. female on day 3 of admission presenting with Malignant neoplasm of ascending colon (Multi).    Subjective   Feels okay and better than yesterday.  Postoperative pain is okay.  Taking her regular diet okay and better than yesterday.  Had a little nausea yesterday but no nausea or vomiting today.  Passing gas.  Had a bowel movement yesterday and today.  No angina, shortness of breath, cough, sputum, calf pain, bleeding, voiding problems.  Ambulating using her I-S.  Says already has all of her home prescription medications.  Feels well enough for discharge today.     Objective     Vital signs in last 24 hours:  Temp:  [36.4 °C (97.5 °F)-36.8 °C (98.2 °F)] 36.8 °C (98.2 °F)  Heart Rate:  [56-73] 56  Resp:  [16-20] 17  BP: (106-148)/(57-79) 148/79  Heart Rate:  [56-73]   Temp:  [36.4 °C (97.5 °F)-36.8 °C (98.2 °F)]   Resp:  [16-20]   BP: (106-148)/(57-79)   Weight:  [66.7 kg (147 lb)]   SpO2:  [93 %-97 %]      Intake/Output last 3 Shifts:  I/O last 3 completed shifts:  In: 840 (12.6 mL/kg) [P.O.:840]  Out: 850 (12.7 mL/kg) [Urine:850 (0.4 mL/kg/hr)]  Weight: 66.7 kg     Physical Exam  No acute distress  Nonseptic appearing  Looks fine and comfortable  Alert and oriented  Heart regular  Lungs clear on room air  No edema  Abdomen soft, positive bowel sounds, mildly distended, nontender, glued incisions without any issues    Scheduled medications  Scheduled Medications[1]  Continuous medications  Continuous Medications[2]  PRN medications  PRN Medications[3]    Relevant Results  Results from last 7 days   Lab Units 08/11/25  0448 08/10/25  0504 08/09/25  0520   WBC AUTO x10*3/uL 7.9 10.3 12.1*   HEMOGLOBIN g/dL 10.2* 10.2* 11.0*   HEMATOCRIT % 31.9* 32.7* 34.5*   PLATELETS AUTO x10*3/uL 342 344 361      Results from last 7 days   Lab Units 08/11/25  0448 08/10/25  0504 08/09/25  0520   SODIUM mmol/L 138 137 138   POTASSIUM mmol/L 3.7 3.7 3.1*   CHLORIDE mmol/L 104 104 101   CO2 mmol/L 29 29 32    BUN mg/dL 16 18 14   CREATININE mg/dL 0.60 0.58 0.65   GLUCOSE mg/dL 86 82 92   CALCIUM mg/dL 8.7 8.6 8.9       No results found for the last 7 days.    Imaging  No results found.    Cardiology, Vascular, and Other Imaging  No other imaging results found for the past 2 days       Assessment/Plan   Assessment & Plan  Malignant neoplasm of ascending colon (Multi)  Resolved status post robotic assisted right colectomy 8/8 for adenocarcinoma  Vital signs stable  Labs as above  Regular diet with Willem  I-S  Plan on most likely discharging today after discussing with Dr Somers    Encounter for deep vein thrombosis (DVT) prophylaxis  Ambulate  SCDs  Lovenox    1055-D/w Dr Somers-discharge home today      Ami Hernandez, APRN-CNP               [1] acetaminophen, 650 mg, oral, q6h  enoxaparin, 40 mg, subcutaneous, Daily  tiZANidine, 2 mg, oral, q6h  [2]    [3] PRN medications: HYDROmorphone, lidocaine, naloxone, ondansetron **OR** ondansetron, oxygen, prochlorperazine **OR** prochlorperazine **OR** prochlorperazine, traMADol

## 2025-08-11 NOTE — CARE PLAN
The patient's goals for the shift include rest    The clinical goals for the shift include pain control and discharge

## 2025-08-15 ENCOUNTER — TELEPHONE (OUTPATIENT)
Dept: SURGERY | Facility: CLINIC | Age: 70
End: 2025-08-15
Payer: MEDICARE

## 2025-08-17 LAB
LAB AP ASR DISCLAIMER: NORMAL
LAB AP BLOCK FOR ADDITIONAL STUDIES: NORMAL
LABORATORY COMMENT REPORT: NORMAL
PATH REPORT.FINAL DX SPEC: NORMAL
PATH REPORT.GROSS SPEC: NORMAL
PATH REPORT.RELEVANT HX SPEC: NORMAL
PATH REPORT.TOTAL CANCER: NORMAL
PATHOLOGY SYNOPTIC REPORT: NORMAL

## 2025-08-21 ENCOUNTER — OFFICE VISIT (OUTPATIENT)
Dept: SURGERY | Facility: CLINIC | Age: 70
End: 2025-08-21
Payer: MEDICARE

## 2025-08-21 VITALS
SYSTOLIC BLOOD PRESSURE: 130 MMHG | TEMPERATURE: 98.2 F | DIASTOLIC BLOOD PRESSURE: 84 MMHG | OXYGEN SATURATION: 99 % | HEART RATE: 76 BPM | BODY MASS INDEX: 23.3 KG/M2 | HEIGHT: 66 IN | WEIGHT: 145 LBS

## 2025-08-21 DIAGNOSIS — C18.9 MALIGNANT NEOPLASM OF COLON, UNSPECIFIED PART OF COLON (MULTI): ICD-10-CM

## 2025-08-21 DIAGNOSIS — Z09 POSTOPERATIVE EXAMINATION: Primary | ICD-10-CM

## 2025-08-21 PROCEDURE — 3075F SYST BP GE 130 - 139MM HG: CPT | Performed by: STUDENT IN AN ORGANIZED HEALTH CARE EDUCATION/TRAINING PROGRAM

## 2025-08-21 PROCEDURE — 3079F DIAST BP 80-89 MM HG: CPT | Performed by: STUDENT IN AN ORGANIZED HEALTH CARE EDUCATION/TRAINING PROGRAM

## 2025-08-21 PROCEDURE — 1160F RVW MEDS BY RX/DR IN RCRD: CPT | Performed by: STUDENT IN AN ORGANIZED HEALTH CARE EDUCATION/TRAINING PROGRAM

## 2025-08-21 PROCEDURE — 1111F DSCHRG MED/CURRENT MED MERGE: CPT | Performed by: STUDENT IN AN ORGANIZED HEALTH CARE EDUCATION/TRAINING PROGRAM

## 2025-08-21 PROCEDURE — 1159F MED LIST DOCD IN RCRD: CPT | Performed by: STUDENT IN AN ORGANIZED HEALTH CARE EDUCATION/TRAINING PROGRAM

## 2025-08-21 PROCEDURE — 99211 OFF/OP EST MAY X REQ PHY/QHP: CPT | Performed by: STUDENT IN AN ORGANIZED HEALTH CARE EDUCATION/TRAINING PROGRAM

## 2025-08-21 PROCEDURE — 3008F BODY MASS INDEX DOCD: CPT | Performed by: STUDENT IN AN ORGANIZED HEALTH CARE EDUCATION/TRAINING PROGRAM

## 2025-08-21 PROCEDURE — 1126F AMNT PAIN NOTED NONE PRSNT: CPT | Performed by: STUDENT IN AN ORGANIZED HEALTH CARE EDUCATION/TRAINING PROGRAM

## 2025-08-21 RX ORDER — HYDROXYZINE PAMOATE 25 MG/1
25 CAPSULE ORAL 3 TIMES DAILY PRN
Qty: 30 CAPSULE | Refills: 1 | Status: SHIPPED | OUTPATIENT
Start: 2025-08-21 | End: 2025-08-31

## 2025-08-21 SDOH — ECONOMIC STABILITY: FOOD INSECURITY: WITHIN THE PAST 12 MONTHS, YOU WORRIED THAT YOUR FOOD WOULD RUN OUT BEFORE YOU GOT MONEY TO BUY MORE.: NEVER TRUE

## 2025-08-21 SDOH — ECONOMIC STABILITY: FOOD INSECURITY: WITHIN THE PAST 12 MONTHS, THE FOOD YOU BOUGHT JUST DIDN'T LAST AND YOU DIDN'T HAVE MONEY TO GET MORE.: NEVER TRUE

## 2025-08-21 ASSESSMENT — ENCOUNTER SYMPTOMS: WOUND: 1

## 2025-08-21 ASSESSMENT — PATIENT HEALTH QUESTIONNAIRE - PHQ9
SUM OF ALL RESPONSES TO PHQ9 QUESTIONS 1 AND 2: 0
1. LITTLE INTEREST OR PLEASURE IN DOING THINGS: NOT AT ALL
2. FEELING DOWN, DEPRESSED OR HOPELESS: NOT AT ALL

## 2025-08-21 ASSESSMENT — PAIN SCALES - GENERAL: PAINLEVEL_OUTOF10: 0-NO PAIN

## 2025-09-02 ENCOUNTER — PATIENT OUTREACH (OUTPATIENT)
Dept: HEMATOLOGY/ONCOLOGY | Facility: CLINIC | Age: 70
End: 2025-09-02
Payer: MEDICARE

## 2025-09-02 SDOH — HEALTH STABILITY: MENTAL HEALTH
DO YOU FEEL STRESS - TENSE, RESTLESS, NERVOUS, OR ANXIOUS, OR UNABLE TO SLEEP AT NIGHT BECAUSE YOUR MIND IS TROUBLED ALL THE TIME - THESE DAYS?: NOT AT ALL

## 2025-09-02 SDOH — HEALTH STABILITY: PHYSICAL HEALTH: ON AVERAGE, HOW MANY MINUTES DO YOU ENGAGE IN EXERCISE AT THIS LEVEL?: 40 MIN

## 2025-09-02 SDOH — ECONOMIC STABILITY: INCOME INSECURITY: IN THE PAST 12 MONTHS HAS THE ELECTRIC, GAS, OIL, OR WATER COMPANY THREATENED TO SHUT OFF SERVICES IN YOUR HOME?: NO

## 2025-09-02 SDOH — ECONOMIC STABILITY: HOUSING INSECURITY: IN THE LAST 12 MONTHS, WAS THERE A TIME WHEN YOU WERE NOT ABLE TO PAY THE MORTGAGE OR RENT ON TIME?: NO

## 2025-09-02 SDOH — HEALTH STABILITY: MENTAL HEALTH: HOW OFTEN DO YOU HAVE A DRINK CONTAINING ALCOHOL?: NEVER

## 2025-09-02 SDOH — HEALTH STABILITY: PHYSICAL HEALTH
HOW OFTEN DO YOU NEED TO HAVE SOMEONE HELP YOU WHEN YOU READ INSTRUCTIONS, PAMPHLETS, OR OTHER WRITTEN MATERIAL FROM YOUR DOCTOR OR PHARMACY?: NEVER

## 2025-09-02 SDOH — ECONOMIC STABILITY: HOUSING INSECURITY: AT ANY TIME IN THE PAST 12 MONTHS, WERE YOU HOMELESS OR LIVING IN A SHELTER (INCLUDING NOW)?: NO

## 2025-09-02 SDOH — HEALTH STABILITY: MENTAL HEALTH: HOW OFTEN DO YOU HAVE SIX OR MORE DRINKS ON ONE OCCASION?: NEVER

## 2025-09-02 SDOH — HEALTH STABILITY: PHYSICAL HEALTH: ON AVERAGE, HOW MANY DAYS PER WEEK DO YOU ENGAGE IN MODERATE TO STRENUOUS EXERCISE (LIKE A BRISK WALK)?: 3 DAYS

## 2025-09-02 SDOH — SOCIAL STABILITY: SOCIAL INSECURITY: WITHIN THE LAST YEAR, HAVE YOU BEEN HUMILIATED OR EMOTIONALLY ABUSED IN OTHER WAYS BY YOUR PARTNER OR EX-PARTNER?: NO

## 2025-09-02 SDOH — SOCIAL STABILITY: SOCIAL INSECURITY: ARE YOU MARRIED, WIDOWED, DIVORCED, SEPARATED, NEVER MARRIED, OR LIVING WITH A PARTNER?: DIVORCED

## 2025-09-02 SDOH — SOCIAL STABILITY: SOCIAL NETWORK
DO YOU BELONG TO ANY CLUBS OR ORGANIZATIONS SUCH AS CHURCH GROUPS, UNIONS, FRATERNAL OR ATHLETIC GROUPS, OR SCHOOL GROUPS?: NO

## 2025-09-02 SDOH — SOCIAL STABILITY: SOCIAL NETWORK: HOW OFTEN DO YOU ATTEND CHURCH OR RELIGIOUS SERVICES?: MORE THAN 4 TIMES PER YEAR

## 2025-09-02 SDOH — SOCIAL STABILITY: SOCIAL NETWORK: HOW OFTEN DO YOU ATTEND MEETINGS OF THE CLUBS OR ORGANIZATIONS YOU BELONG TO?: NEVER

## 2025-09-02 SDOH — SOCIAL STABILITY: SOCIAL INSECURITY: WITHIN THE LAST YEAR, HAVE YOU BEEN AFRAID OF YOUR PARTNER OR EX-PARTNER?: NO

## 2025-09-02 SDOH — SOCIAL STABILITY: SOCIAL NETWORK: HOW OFTEN DO YOU GET TOGETHER WITH FRIENDS OR RELATIVES?: ONCE A WEEK

## 2025-09-02 SDOH — ECONOMIC STABILITY: FOOD INSECURITY: HOW HARD IS IT FOR YOU TO PAY FOR THE VERY BASICS LIKE FOOD, HOUSING, MEDICAL CARE, AND HEATING?: NOT HARD AT ALL

## 2025-09-02 SDOH — ECONOMIC STABILITY: FOOD INSECURITY: WITHIN THE PAST 12 MONTHS, YOU WORRIED THAT YOUR FOOD WOULD RUN OUT BEFORE YOU GOT THE MONEY TO BUY MORE.: NEVER TRUE

## 2025-09-02 SDOH — ECONOMIC STABILITY: FOOD INSECURITY: WITHIN THE PAST 12 MONTHS, THE FOOD YOU BOUGHT JUST DIDN'T LAST AND YOU DIDN'T HAVE MONEY TO GET MORE.: NEVER TRUE

## 2025-09-02 SDOH — HEALTH STABILITY: MENTAL HEALTH: HOW MANY DRINKS CONTAINING ALCOHOL DO YOU HAVE ON A TYPICAL DAY WHEN YOU ARE DRINKING?: PATIENT DOES NOT DRINK

## 2025-09-02 SDOH — ECONOMIC STABILITY: TRANSPORTATION INSECURITY: IN THE PAST 12 MONTHS, HAS LACK OF TRANSPORTATION KEPT YOU FROM MEDICAL APPOINTMENTS OR FROM GETTING MEDICATIONS?: NO

## 2025-09-02 ASSESSMENT — LIFESTYLE VARIABLES
AUDIT-C TOTAL SCORE: 0
SKIP TO QUESTIONS 9-10: 1

## 2025-09-02 ASSESSMENT — ACTIVITIES OF DAILY LIVING (ADL): LACK_OF_TRANSPORTATION: NO

## 2025-09-04 ENCOUNTER — SOCIAL WORK (OUTPATIENT)
Dept: CASE MANAGEMENT | Facility: HOSPITAL | Age: 70
End: 2025-09-04
Payer: MEDICARE

## 2025-09-04 ENCOUNTER — HOSPITAL ENCOUNTER (OUTPATIENT)
Dept: RADIOLOGY | Facility: CLINIC | Age: 70
Discharge: HOME | End: 2025-09-04
Payer: MEDICARE

## 2025-09-04 DIAGNOSIS — C18.2 MALIGNANT NEOPLASM OF ASCENDING COLON (MULTI): ICD-10-CM

## 2025-09-04 PROCEDURE — 71260 CT THORAX DX C+: CPT | Performed by: RADIOLOGY

## 2025-09-04 PROCEDURE — 71260 CT THORAX DX C+: CPT

## 2025-09-04 PROCEDURE — 74177 CT ABD & PELVIS W/CONTRAST: CPT | Performed by: RADIOLOGY

## 2025-09-04 PROCEDURE — 2550000001 HC RX 255 CONTRASTS: Performed by: STUDENT IN AN ORGANIZED HEALTH CARE EDUCATION/TRAINING PROGRAM

## 2025-09-04 RX ADMIN — IOHEXOL 75 ML: 350 INJECTION, SOLUTION INTRAVENOUS at 13:29

## (undated) DEVICE — GLOVE, SURGICAL, SYNTHETIC, DERMAPRENE, 8.5, PF, LF, GREEN

## (undated) DEVICE — CATHETER TRAY, URETHRAL, FOLEY, 16 FR, SILICONE

## (undated) DEVICE — STAPLER, SUREFORM 60, DAVINCI XI

## (undated) DEVICE — SUTURE, PLAIN, 5-0, 18 IN, PC1, YELLOW

## (undated) DEVICE — SUTURE, CHROMIC GUT, 5/0, P-3, 18IN

## (undated) DEVICE — SEALER, VESSEL, EXTENDED

## (undated) DEVICE — SUTURE, VICRYL, 3-0, 27 IN, SH, VIOLET

## (undated) DEVICE — SUTURE, V-LOC 180, 2-0, 9 IN, GS-22, GREEN

## (undated) DEVICE — SOLUTION, IRRIGATION, X RX SODIUM CHL 0.9%, 1000ML BTL

## (undated) DEVICE — STRIP, SKIN CLOSURE, STERI STRIP, REINFORCED, 0.5 X 4 IN

## (undated) DEVICE — GOWN, SURGICAL, SIRUS, NON REINFORCED, LARGE

## (undated) DEVICE — SEAL, UNIVERSAL, 5-12MM

## (undated) DEVICE — GLOVE, SURGICAL, PROTEXIS PI , 8.5, PF, LF

## (undated) DEVICE — SUTURE, VICRYL, 2-0, 27 IN, BR/SH 27, VIOLET

## (undated) DEVICE — SLEEVE, VASO PRESS, CALF GARMENT, MEDIUM, GREEN

## (undated) DEVICE — Device

## (undated) DEVICE — CORD, CAUTERY, BIOPOLAR FORCEP, 12FT

## (undated) DEVICE — CORD, CAUTERY, BIPOLAR, STERILE

## (undated) DEVICE — SUTURE, PROLENE, 5-0, 18 IN, P3, BLUE

## (undated) DEVICE — CLEAN KIT, ANTIFOG SCOPE, SEE SHARP 100MM

## (undated) DEVICE — GLOVE, SURGICAL, PROTEXIS PI BLUE W/NEUTHERA, 7.5, PF, LF

## (undated) DEVICE — ADHESIVE, SKIN, DERMABOND ADVANCED, 15CM, PEN-STYLE

## (undated) DEVICE — APPLIER, CLIP LARGE XI

## (undated) DEVICE — SUTURE, PDS II, 0 36 IN, CT-1, VIOLET

## (undated) DEVICE — SUTURE, MONOCRYLIC, 5-0, 18 IN, P-3

## (undated) DEVICE — SUTURE, V-LOC 3-0 V-20 6 GR 180 ABS"

## (undated) DEVICE — DRIVER, NEEDLE, MEGA SUTURE CUT, DAVINCI XI

## (undated) DEVICE — NEEDLE, HYPODERMIC, MONOJECT, 25 G X 1.5 IN, LUER LOCK HUB, RED

## (undated) DEVICE — FORCEPS, PROGRASP, DAVINCI XI

## (undated) DEVICE — SUTURE, MONOCRYL, 4-0, 27 IN, PS-2, UNDYED

## (undated) DEVICE — SEAL, UNIVERSAL 5-8MM  XI

## (undated) DEVICE — GLOVE, SURGICAL, PROTEXIS PI , 7.0, PF, LF

## (undated) DEVICE — GOWN, SURGICAL, SMARTGOWN, XLARGE, STERILE

## (undated) DEVICE — NEEDLE, ELECTRODE, ELECTROSURGICAL, INSULATED

## (undated) DEVICE — RELOAD, SUREFORM STAPLER 60, 3.5 BLUE, DAVINCI XI

## (undated) DEVICE — DRAPE, ARM XI

## (undated) DEVICE — CLIP, LIGATING, HEM-O-LOCK, MLX, LARGE, LF, PURPLE

## (undated) DEVICE — DRAPE, TAPE, ORTHO

## (undated) DEVICE — NEEDLE, INSUFFLATION, 13GAX120MM, DISP

## (undated) DEVICE — CANNULA, AIRSEAL, CAP & OBTURATOR 8MM